# Patient Record
Sex: FEMALE | Race: WHITE | Employment: FULL TIME
[De-identification: names, ages, dates, MRNs, and addresses within clinical notes are randomized per-mention and may not be internally consistent; named-entity substitution may affect disease eponyms.]

---

## 2017-01-12 ENCOUNTER — OFFICE VISIT (OUTPATIENT)
Dept: OBGYN | Facility: CLINIC | Age: 27
End: 2017-01-12

## 2017-01-12 VITALS
HEIGHT: 63 IN | RESPIRATION RATE: 18 BRPM | WEIGHT: 133 LBS | BODY MASS INDEX: 23.57 KG/M2 | HEART RATE: 76 BPM | DIASTOLIC BLOOD PRESSURE: 68 MMHG | SYSTOLIC BLOOD PRESSURE: 112 MMHG

## 2017-01-12 DIAGNOSIS — N76.0 BV (BACTERIAL VAGINOSIS): ICD-10-CM

## 2017-01-12 DIAGNOSIS — B96.89 BV (BACTERIAL VAGINOSIS): ICD-10-CM

## 2017-01-12 DIAGNOSIS — R10.2 VAGINAL PAIN: ICD-10-CM

## 2017-01-12 DIAGNOSIS — N89.8 VAGINA ITCHING: Primary | ICD-10-CM

## 2017-01-12 PROCEDURE — 1036F TOBACCO NON-USER: CPT | Performed by: OBSTETRICS & GYNECOLOGY

## 2017-01-12 PROCEDURE — G8427 DOCREV CUR MEDS BY ELIG CLIN: HCPCS | Performed by: OBSTETRICS & GYNECOLOGY

## 2017-01-12 PROCEDURE — G8420 CALC BMI NORM PARAMETERS: HCPCS | Performed by: OBSTETRICS & GYNECOLOGY

## 2017-01-12 PROCEDURE — G8484 FLU IMMUNIZE NO ADMIN: HCPCS | Performed by: OBSTETRICS & GYNECOLOGY

## 2017-01-12 PROCEDURE — 99213 OFFICE O/P EST LOW 20 MIN: CPT | Performed by: OBSTETRICS & GYNECOLOGY

## 2017-01-12 RX ORDER — METRONIDAZOLE 500 MG/1
500 TABLET ORAL 2 TIMES DAILY
Qty: 14 TABLET | Refills: 1 | Status: SHIPPED | OUTPATIENT
Start: 2017-01-12 | End: 2017-01-19

## 2017-01-12 RX ORDER — CLOTRIMAZOLE AND BETAMETHASONE DIPROPIONATE 10; .64 MG/G; MG/G
CREAM TOPICAL
Qty: 15 G | Refills: 2 | Status: SHIPPED | OUTPATIENT
Start: 2017-01-12 | End: 2017-11-21 | Stop reason: ALTCHOICE

## 2017-01-12 ASSESSMENT — PATIENT HEALTH QUESTIONNAIRE - PHQ9
SUM OF ALL RESPONSES TO PHQ QUESTIONS 1-9: 0
2. FEELING DOWN, DEPRESSED OR HOPELESS: 0
SUM OF ALL RESPONSES TO PHQ9 QUESTIONS 1 & 2: 0
1. LITTLE INTEREST OR PLEASURE IN DOING THINGS: 0

## 2017-01-17 RX ORDER — FLUCONAZOLE 150 MG/1
TABLET ORAL
Qty: 2 TABLET | Refills: 0 | Status: SHIPPED | OUTPATIENT
Start: 2017-01-17 | End: 2017-11-21 | Stop reason: ALTCHOICE

## 2017-04-14 PROBLEM — J02.0 PHARYNGITIS DUE TO STREPTOCOCCUS SPECIES: Status: ACTIVE | Noted: 2017-04-14

## 2017-11-21 ENCOUNTER — HOSPITAL ENCOUNTER (OUTPATIENT)
Age: 27
Setting detail: SPECIMEN
Discharge: HOME | End: 2017-11-21
Payer: COMMERCIAL

## 2017-11-21 ENCOUNTER — OFFICE VISIT (OUTPATIENT)
Dept: OBGYN | Age: 27
End: 2017-11-21
Payer: COMMERCIAL

## 2017-11-21 VITALS
BODY MASS INDEX: 25.16 KG/M2 | HEIGHT: 63 IN | DIASTOLIC BLOOD PRESSURE: 68 MMHG | WEIGHT: 142 LBS | SYSTOLIC BLOOD PRESSURE: 128 MMHG

## 2017-11-21 DIAGNOSIS — Z01.419 WOMEN'S ANNUAL ROUTINE GYNECOLOGICAL EXAMINATION: Primary | ICD-10-CM

## 2017-11-21 DIAGNOSIS — Z01.419 WOMEN'S ANNUAL ROUTINE GYNECOLOGICAL EXAMINATION: ICD-10-CM

## 2017-11-21 DIAGNOSIS — Z31.7 ENCOUNTER FOR PROCREATIVE MANAGEMENT AND COUNSELING FOR PERSON ACTING AS GESTATIONAL SURROGATE: ICD-10-CM

## 2017-11-21 DIAGNOSIS — Z12.39 SCREENING FOR BREAST CANCER: ICD-10-CM

## 2017-11-21 PROCEDURE — G0145 SCR C/V CYTO,THINLAYER,RESCR: HCPCS

## 2017-11-21 PROCEDURE — 99395 PREV VISIT EST AGE 18-39: CPT | Performed by: OBSTETRICS & GYNECOLOGY

## 2017-11-21 RX ORDER — NORETHINDRONE ACETATE AND ETHINYL ESTRADIOL 1MG-20(21)
1 KIT ORAL DAILY
Qty: 1 PACKET | Refills: 11 | Status: SHIPPED | OUTPATIENT
Start: 2017-11-21 | End: 2018-11-07

## 2017-11-21 NOTE — PROGRESS NOTES
YEARLY PHYSICAL    Date of service: 2017    Maude Armstrong  Is a 32 y.o.  single female    PT's PCP is: Jovanna Tran MD     : 1990                                             Subjective:       Patient's last menstrual period was 10/29/2017 (exact date). Are your menses regular: yes    OB History    Para Term  AB Living   3 2 2   1     SAB TAB Ectopic Molar Multiple Live Births   1                # Outcome Date GA Lbr Robin/2nd Weight Sex Delivery Anes PTL Lv   3 Term 14 39w5d 07:01 8 lb 6.1 oz (3.802 kg) F Vag-Spont      2 SAB               Birth Comments: missed   1 Term  41w0d  9 lb 4 oz (4.196 kg) M Vag-Spont              History   Smoking Status    Never Smoker   Smokeless Tobacco    Never Used        History   Alcohol Use No       Family History   Problem Relation Age of Onset    Asthma Brother     Other Brother      internal strep infection causing organ failure, age 13       Allergies: Review of patient's allergies indicates no known allergies. Current Outpatient Prescriptions:     Lactobacillus (PROBIOTIC ACIDOPHILUS PO), Take by mouth, Disp: , Rfl:     History   Sexual Activity    Sexual activity: Yes    Partners: Male       Any bleeding or pain with intercourse: No    Last Yearly:  unknown    Last pap: unkown    Last HPV: never    Last Mammogram: never    Last Dexascan never    Do you do self breast exams: Yes    History reviewed. No pertinent past medical history.     Past Surgical History:   Procedure Laterality Date    DILATION AND CURETTAGE OF UTERUS  13    missed        Family History   Problem Relation Age of Onset    Asthma Brother     Other Brother      internal strep infection causing organ failure, age 13       Chief Complaint   Patient presents with    Gynecologic Exam          Nurse: CHAVO  HPI: Pt presents for annual exam. She denies any current gyn concerns. She is having regular cycles, denies pain. She states she is interested in considering being a surrogate for pregnancy and has been exploring this for a year. Review of her medical history does not identify any contraindications and she is advised of this. She has a h/o 1st trimester miscarriage but no issues w/ recurrent pregnancy loss. She inquires if she is a good candidate and is advised that medically I see no concerns. She is cautioned that it is an emotional investment for both patient and family and she seems to have good insight into this. She plans to have psychological testing and potentially counselling prior to committing to this decision. 15 min spent discussing surrogacy and different aspects of this. Reviewed possible need for std screening but will wait for a definitive decision prior to ordering any testing. Yes PT denies fever, chills, nausea and vomiting       Objective  Lymphatic:   no lymphadenopathy  Heent:   negative   Cor: regular rate and rhythm, no murmurs              Pul:clear to auscultation bilaterally- no wheezes, rales or rhonchi, normal air movement, no respiratory distress      GI: Abdomen soft, non-tender. BS normal. No masses,  No organomegaly           Extremities: normal strength, tone, and muscle mass   Breasts: Breast:normal appearance, no masses or tenderness   Pelvic Exam: GENITAL/URINARY:  External Genitalia:  General appearance; normal,pt is removing all pubic hairl, Lesions absent  Urethral Meatus:  Size normal, Location normal, Lesions absent, Prolapse absent  Urethra:   Fullness absent, Masses absent  Bladder:  Fullness absent, Masses absent, Tenderness absent, Cystocele absent  Vagina:  General appearance normal, Estrogen effect normal, Discharge absent, Lesions absent, Pelvic support normal  Cervix:  General appearance normal, Lesions absent, Discharge absent, Tenderness absent, Enlargement absent, Nodularity absent  Uterus:  Size normal, Contour normal, Position normal, Tenderness absent, uterine decensus present, no overt prolapse noted. No traction applied to cervix (very anterior)  Adenexa: Masses absent, Tenderness absent, Enlargement absent  Anus/Perineum:  Lesions absent and Masses absent                                    Vaginal discharge: no vaginal discharge        She was also counseled on her preventative health maintenance recommendations and follow-up. Assessment and Plan         1. Women's annual routine gynecological examination  PAP SMEAR   2. Screening for breast cancer     3. Encounter for procreative management and counseling for person acting as gestational surrogate               I have discontinued Ms. Rebolledo's clotrimazole-betamethasone, fluconazole, and nystatin. I am also having her start on norethindrone-ethinyl estradiol. Additionally, I am having her maintain her Lactobacillus (PROBIOTIC ACIDOPHILUS PO).     Noemy José,11/21/2017 9:51 AM

## 2017-11-30 LAB — CYTOLOGY REPORT: NORMAL

## 2018-09-13 ENCOUNTER — HOSPITAL ENCOUNTER (OUTPATIENT)
Age: 28
Setting detail: SPECIMEN
Discharge: HOME | End: 2018-09-13
Payer: COMMERCIAL

## 2018-09-13 ENCOUNTER — OFFICE VISIT (OUTPATIENT)
Dept: PRIMARY CARE CLINIC | Age: 28
End: 2018-09-13
Payer: COMMERCIAL

## 2018-09-13 VITALS
SYSTOLIC BLOOD PRESSURE: 123 MMHG | HEIGHT: 63 IN | WEIGHT: 138.3 LBS | RESPIRATION RATE: 14 BRPM | BODY MASS INDEX: 24.5 KG/M2 | DIASTOLIC BLOOD PRESSURE: 72 MMHG | HEART RATE: 100 BPM | TEMPERATURE: 99 F

## 2018-09-13 DIAGNOSIS — J02.9 SORE THROAT: ICD-10-CM

## 2018-09-13 DIAGNOSIS — J03.90 EXUDATIVE TONSILLITIS: Primary | ICD-10-CM

## 2018-09-13 DIAGNOSIS — Z20.818 STREP THROAT EXPOSURE: ICD-10-CM

## 2018-09-13 DIAGNOSIS — J03.90 EXUDATIVE TONSILLITIS: ICD-10-CM

## 2018-09-13 LAB — S PYO AG THROAT QL: NORMAL

## 2018-09-13 PROCEDURE — 87880 STREP A ASSAY W/OPTIC: CPT | Performed by: NURSE PRACTITIONER

## 2018-09-13 PROCEDURE — 99213 OFFICE O/P EST LOW 20 MIN: CPT | Performed by: NURSE PRACTITIONER

## 2018-09-13 PROCEDURE — 87651 STREP A DNA AMP PROBE: CPT

## 2018-09-13 RX ORDER — AMOXICILLIN 500 MG/1
500 CAPSULE ORAL 3 TIMES DAILY
Qty: 30 CAPSULE | Refills: 0 | Status: SHIPPED | OUTPATIENT
Start: 2018-09-13 | End: 2018-09-23

## 2018-09-13 ASSESSMENT — ENCOUNTER SYMPTOMS
SWOLLEN GLANDS: 1
TROUBLE SWALLOWING: 0
VOMITING: 0
COUGH: 0
SORE THROAT: 1
ABDOMINAL PAIN: 0

## 2018-09-14 LAB
DIRECT EXAM: NORMAL
Lab: NORMAL
SPECIMEN DESCRIPTION: NORMAL
STATUS: NORMAL

## 2018-09-17 RX ORDER — FLUCONAZOLE 150 MG/1
150 TABLET ORAL ONCE
Qty: 1 TABLET | Refills: 0 | Status: SHIPPED | OUTPATIENT
Start: 2018-09-17 | End: 2018-09-17

## 2018-11-07 ENCOUNTER — INITIAL PRENATAL (OUTPATIENT)
Dept: OBGYN | Age: 28
End: 2018-11-07
Payer: COMMERCIAL

## 2018-11-07 ENCOUNTER — HOSPITAL ENCOUNTER (OUTPATIENT)
Age: 28
Setting detail: SPECIMEN
Discharge: HOME | End: 2018-11-07
Payer: COMMERCIAL

## 2018-11-07 VITALS — WEIGHT: 140.8 LBS | DIASTOLIC BLOOD PRESSURE: 76 MMHG | SYSTOLIC BLOOD PRESSURE: 118 MMHG | BODY MASS INDEX: 24.94 KG/M2

## 2018-11-07 DIAGNOSIS — Z32.01 POSITIVE URINE PREGNANCY TEST: ICD-10-CM

## 2018-11-07 DIAGNOSIS — Z34.91 ENCOUNTER FOR SUPERVISION OF NORMAL PREGNANCY IN FIRST TRIMESTER, UNSPECIFIED GRAVIDITY: ICD-10-CM

## 2018-11-07 DIAGNOSIS — N91.2 AMENORRHEA: ICD-10-CM

## 2018-11-07 DIAGNOSIS — N91.2 AMENORRHEA: Primary | ICD-10-CM

## 2018-11-07 LAB
ABO/RH: NORMAL
AMPHETAMINE SCREEN URINE: NEGATIVE
ANTIBODY SCREEN: NEGATIVE
BARBITURATE SCREEN URINE: NEGATIVE
BENZODIAZEPINE SCREEN, URINE: NEGATIVE
BUPRENORPHINE URINE: NEGATIVE
CANNABINOID SCREEN URINE: NEGATIVE
COCAINE METABOLITE, URINE: NEGATIVE
CONTROL: ABNORMAL
MDMA URINE: NORMAL
METHADONE SCREEN, URINE: NEGATIVE
METHAMPHETAMINE, URINE: NEGATIVE
OPIATES, URINE: NEGATIVE
OXYCODONE SCREEN URINE: NEGATIVE
PHENCYCLIDINE, URINE: NEGATIVE
PREGNANCY TEST URINE, POC: POSITIVE
PROPOXYPHENE, URINE: NEGATIVE
TEST INFORMATION: NORMAL
TRICYCLIC ANTIDEPRESSANTS, UR: NEGATIVE

## 2018-11-07 PROCEDURE — 86803 HEPATITIS C AB TEST: CPT

## 2018-11-07 PROCEDURE — 87389 HIV-1 AG W/HIV-1&-2 AB AG IA: CPT

## 2018-11-07 PROCEDURE — 36415 COLL VENOUS BLD VENIPUNCTURE: CPT | Performed by: ADVANCED PRACTICE MIDWIFE

## 2018-11-07 PROCEDURE — 86901 BLOOD TYPING SEROLOGIC RH(D): CPT

## 2018-11-07 PROCEDURE — 87491 CHLMYD TRACH DNA AMP PROBE: CPT

## 2018-11-07 PROCEDURE — 85025 COMPLETE CBC W/AUTO DIFF WBC: CPT

## 2018-11-07 PROCEDURE — 80306 DRUG TEST PRSMV INSTRMNT: CPT

## 2018-11-07 PROCEDURE — 86780 TREPONEMA PALLIDUM: CPT

## 2018-11-07 PROCEDURE — 87086 URINE CULTURE/COLONY COUNT: CPT

## 2018-11-07 PROCEDURE — 86762 RUBELLA ANTIBODY: CPT

## 2018-11-07 PROCEDURE — 0502F SUBSEQUENT PRENATAL CARE: CPT | Performed by: ADVANCED PRACTICE MIDWIFE

## 2018-11-07 PROCEDURE — 87591 N.GONORRHOEAE DNA AMP PROB: CPT

## 2018-11-07 PROCEDURE — 86900 BLOOD TYPING SEROLOGIC ABO: CPT

## 2018-11-07 PROCEDURE — 86850 RBC ANTIBODY SCREEN: CPT

## 2018-11-07 PROCEDURE — 87340 HEPATITIS B SURFACE AG IA: CPT

## 2018-11-07 ASSESSMENT — PATIENT HEALTH QUESTIONNAIRE - PHQ9
2. FEELING DOWN, DEPRESSED OR HOPELESS: 0
SUM OF ALL RESPONSES TO PHQ QUESTIONS 1-9: 0
SUM OF ALL RESPONSES TO PHQ QUESTIONS 1-9: 0
SUM OF ALL RESPONSES TO PHQ9 QUESTIONS 1 & 2: 0
1. LITTLE INTEREST OR PLEASURE IN DOING THINGS: 0

## 2018-11-07 NOTE — PROGRESS NOTES
New OB Visit    Date of service: 2018    Shanti Mendoza  Is a 25 y.o.  female presenting for a New OB visit with Nurse. Name of Father of Frazier Goodpasture is [facility] and is involved. Pt does work at [employer].      PT's PCP is: Regina Winn MD     : 1990                                             Subjective:       Patient's last menstrual period was 2018 (approximate). OB History    Para Term  AB Living   4 2 2   1 2   SAB TAB Ectopic Molar Multiple Live Births   1         2      # Outcome Date GA Lbr Robin/2nd Weight Sex Delivery Anes PTL Lv   4 Current            3 Term 14 39w5d 07:01 8 lb 6.1 oz (3.802 kg) F Vag-Spont EPI  VIOLETA   2 SAB  9w0d             Birth Comments: 13 week D&C   1 Term 11 41w0d  9 lb 4 oz (4.196 kg) M Vag-Spont EPI N VIOLETA                History   Smoking Status    Never Smoker   Smokeless Tobacco    Never Used        History   Alcohol Use No        Allergies: Patient has no known allergies. Current Outpatient Prescriptions:     Prenatal MV-Min-Fe Fum-FA-DHA (PRENATAL 1 PO), Take by mouth daily, Disp: , Rfl:     Lactobacillus (PROBIOTIC ACIDOPHILUS PO), Take by mouth, Disp: , Rfl:       Vital Signs Last menstrual period 2018, not currently breastfeeding. No results found for this visit on 18. Pain: none                            Nausea: yes      Vomiting: no        Breast enlargement or tenderness: yes    Frequency of urination:yes      Fatigue: yes           Patient history reviewed. Educational materials given and genetic testing reviewed. Assessment:      Diagnosis Orders   1. Amenorrhea  C.trachomatis N.gonorrhoeae DNA, Urine    Hepatitis C Antibody    HIV Screen    POCT urine pregnancy    PRENATAL PROFILE I    Prenatal type and screen    Urine culture clean catch    Urine Drug Screen, Comprehensive   2.  Positive urine pregnancy test  C.trachomatis N.gonorrhoeae DNA, Urine

## 2018-11-08 LAB
ABSOLUTE EOS #: 0.12 K/UL (ref 0–0.44)
ABSOLUTE IMMATURE GRANULOCYTE: <0.03 K/UL (ref 0–0.3)
ABSOLUTE LYMPH #: 2.15 K/UL (ref 1.1–3.7)
ABSOLUTE MONO #: 0.47 K/UL (ref 0.1–1.2)
BASOPHILS # BLD: 0 % (ref 0–2)
BASOPHILS ABSOLUTE: 0.03 K/UL (ref 0–0.2)
DIFFERENTIAL TYPE: ABNORMAL
EOSINOPHILS RELATIVE PERCENT: 2 % (ref 1–4)
HCT VFR BLD CALC: 36.9 % (ref 36.3–47.1)
HEMOGLOBIN: 12 G/DL (ref 11.9–15.1)
HEPATITIS B SURFACE ANTIGEN: NONREACTIVE
HEPATITIS C ANTIBODY: NONREACTIVE
HIV AG/AB: NONREACTIVE
IMMATURE GRANULOCYTES: 0 %
LYMPHOCYTES # BLD: 31 % (ref 24–43)
MCH RBC QN AUTO: 31.4 PG (ref 25.2–33.5)
MCHC RBC AUTO-ENTMCNC: 32.5 G/DL (ref 28.4–34.8)
MCV RBC AUTO: 96.6 FL (ref 82.6–102.9)
MONOCYTES # BLD: 7 % (ref 3–12)
NRBC AUTOMATED: 0 PER 100 WBC
PDW BLD-RTO: 12.2 % (ref 11.8–14.4)
PLATELET # BLD: 243 K/UL (ref 138–453)
PLATELET ESTIMATE: ABNORMAL
PMV BLD AUTO: 11.2 FL (ref 8.1–13.5)
RBC # BLD: 3.82 M/UL (ref 3.95–5.11)
RBC # BLD: ABNORMAL 10*6/UL
RUBV IGG SER QL: 240.4 IU/ML
SEG NEUTROPHILS: 60 % (ref 36–65)
SEGMENTED NEUTROPHILS ABSOLUTE COUNT: 4.1 K/UL (ref 1.5–8.1)
T. PALLIDUM, IGG: NONREACTIVE
WBC # BLD: 6.9 K/UL (ref 3.5–11.3)
WBC # BLD: ABNORMAL 10*3/UL

## 2018-11-09 LAB
C. TRACHOMATIS DNA ,URINE: NEGATIVE
CULTURE: NORMAL
Lab: NORMAL
N. GONORRHOEAE DNA, URINE: NEGATIVE
SPECIMEN DESCRIPTION: NORMAL
STATUS: NORMAL

## 2018-11-12 RX ORDER — PROMETHAZINE HYDROCHLORIDE 25 MG/1
25 TABLET ORAL EVERY 6 HOURS PRN
Qty: 30 TABLET | Refills: 1 | Status: ON HOLD | OUTPATIENT
Start: 2018-11-12 | End: 2019-06-22 | Stop reason: HOSPADM

## 2018-11-12 RX ORDER — ONDANSETRON 4 MG/1
4 TABLET, FILM COATED ORAL DAILY PRN
Qty: 30 TABLET | Refills: 0 | OUTPATIENT
Start: 2018-11-12

## 2018-11-21 ENCOUNTER — PROCEDURE VISIT (OUTPATIENT)
Dept: OBGYN | Age: 28
End: 2018-11-21
Payer: COMMERCIAL

## 2018-11-21 DIAGNOSIS — Z87.59 ULTRASOUND SCAN TO CONFIRM FETAL VIABILITY WITH HISTORY OF MISCARRIAGE: Primary | ICD-10-CM

## 2018-11-21 DIAGNOSIS — O36.80X0 ULTRASOUND SCAN TO CONFIRM FETAL VIABILITY WITH HISTORY OF MISCARRIAGE: Primary | ICD-10-CM

## 2018-11-21 DIAGNOSIS — Z3A.08 8 WEEKS GESTATION OF PREGNANCY: ICD-10-CM

## 2018-11-21 LAB
CRL: 2.19 CM
SAC DIAMETER: NORMAL CM

## 2018-11-21 PROCEDURE — 76801 OB US < 14 WKS SINGLE FETUS: CPT | Performed by: OBSTETRICS & GYNECOLOGY

## 2018-12-10 ENCOUNTER — HOSPITAL ENCOUNTER (OUTPATIENT)
Age: 28
Setting detail: SPECIMEN
Discharge: HOME | End: 2018-12-10
Payer: COMMERCIAL

## 2018-12-10 ENCOUNTER — ROUTINE PRENATAL (OUTPATIENT)
Dept: OBGYN | Age: 28
End: 2018-12-10

## 2018-12-10 VITALS — DIASTOLIC BLOOD PRESSURE: 72 MMHG | WEIGHT: 141.8 LBS | SYSTOLIC BLOOD PRESSURE: 118 MMHG | BODY MASS INDEX: 25.12 KG/M2

## 2018-12-10 DIAGNOSIS — Z3A.11 11 WEEKS GESTATION OF PREGNANCY: ICD-10-CM

## 2018-12-10 DIAGNOSIS — Z34.81 ENCOUNTER FOR SUPERVISION OF OTHER NORMAL PREGNANCY IN FIRST TRIMESTER: Primary | ICD-10-CM

## 2018-12-10 PROCEDURE — 87070 CULTURE OTHR SPECIMN AEROBIC: CPT

## 2018-12-10 PROCEDURE — 0500F INITIAL PRENATAL CARE VISIT: CPT | Performed by: ADVANCED PRACTICE MIDWIFE

## 2018-12-10 NOTE — PROGRESS NOTES
Maria De Jesus Gomez is here at 11w4d for:    Chief Complaint   Patient presents with    Routine Prenatal Visit     Patient presents today for routine OB visit;        Estimated Due Date: Estimated Date of Delivery: 19    OB History    Para Term  AB Living   4 2 2   1 2   SAB TAB Ectopic Molar Multiple Live Births   1         2      # Outcome Date GA Lbr Robin/2nd Weight Sex Delivery Anes PTL Lv   4 Current            3 Term 14 39w5d 07:01 8 lb 6.1 oz (3.802 kg) F Vag-Spont EPI  VIOLETA   2 SAB  9w0d             Birth Comments: 13 week D&C   1 Term 11 41w0d  9 lb 4 oz (4.196 kg) M Vag-Spont EPI N VIOLETA           Past Medical History:   Diagnosis Date    Breast disorder     Baltazar Im infection        Past Surgical History:   Procedure Laterality Date    DILATION AND CURETTAGE      DILATION AND CURETTAGE OF UTERUS  13    missed        History   Smoking Status    Never Smoker   Smokeless Tobacco    Never Used        History   Alcohol Use No       No results found for this visit on 12/10/18. Vitals:  /72   Wt 141 lb 12.8 oz (64.3 kg)   LMP 2018 (Approximate)   BMI 25.12 kg/m²   Estimated body mass index is 25.12 kg/m² as calculated from the following:    Height as of 18: 5' 3\" (1.6 m). Weight as of this encounter: 141 lb 12.8 oz (64.3 kg). Labs:    No results found for this visit on 12/10/18. HPI: here for initial ob visit, and exam     PT denies fever, chills, nausea and vomiting       Abdomen: flat, soft, nontender  Total body exam completed please see prenatal physical exam tab in visit navigator       See prenatal vital sign section and fetal assessment section    ASSESSMENT & Plan    Diagnosis Orders   1. Encounter for supervision of other normal pregnancy in first trimester     2. 11 weeks gestation of pregnancy  Culture, Genital             I am having Ms. Rebolledo maintain her Lactobacillus (PROBIOTIC ACIDOPHILUS PO), Prenatal

## 2018-12-10 NOTE — PROGRESS NOTES
Patient states she is having a lot of nausea; Patient states she is taking the phenergan every other day, b/c it makes her really sleepy, so she is only taking half, however states the morning sickness is awful; Patient states she has had some cramping that got worse over the weekend, and states its lower cramping in the pelvic area; Patient states she started bleeding, sometimes its dark and sometimes it brighter and it seems heavier in the mornings;  Patients last pap was 11/2017;

## 2018-12-13 LAB
CULTURE: NORMAL
Lab: NORMAL
SPECIMEN DESCRIPTION: NORMAL
STATUS: NORMAL

## 2019-01-07 ENCOUNTER — ROUTINE PRENATAL (OUTPATIENT)
Dept: OBGYN | Age: 29
End: 2019-01-07

## 2019-01-07 VITALS — WEIGHT: 140.6 LBS | SYSTOLIC BLOOD PRESSURE: 122 MMHG | DIASTOLIC BLOOD PRESSURE: 70 MMHG | BODY MASS INDEX: 24.91 KG/M2

## 2019-01-07 DIAGNOSIS — Z3A.15 15 WEEKS GESTATION OF PREGNANCY: ICD-10-CM

## 2019-01-07 DIAGNOSIS — Z34.82 ENCOUNTER FOR SUPERVISION OF OTHER NORMAL PREGNANCY IN SECOND TRIMESTER: Primary | ICD-10-CM

## 2019-01-07 DIAGNOSIS — O21.9 NAUSEA AND VOMITING DURING PREGNANCY: ICD-10-CM

## 2019-01-07 PROCEDURE — 0502F SUBSEQUENT PRENATAL CARE: CPT | Performed by: ADVANCED PRACTICE MIDWIFE

## 2019-01-07 RX ORDER — PROMETHAZINE HYDROCHLORIDE 25 MG/1
25 TABLET ORAL EVERY 6 HOURS PRN
Qty: 30 TABLET | Refills: 1 | Status: SHIPPED | OUTPATIENT
Start: 2019-01-07 | End: 2019-01-14

## 2019-02-11 ENCOUNTER — ROUTINE PRENATAL (OUTPATIENT)
Dept: OBGYN | Age: 29
End: 2019-02-11
Payer: COMMERCIAL

## 2019-02-11 VITALS — DIASTOLIC BLOOD PRESSURE: 76 MMHG | BODY MASS INDEX: 26.22 KG/M2 | SYSTOLIC BLOOD PRESSURE: 120 MMHG | WEIGHT: 148 LBS

## 2019-02-11 DIAGNOSIS — Z36.3 ANTENATAL SCREENING FOR MALFORMATION USING ULTRASONICS: Primary | ICD-10-CM

## 2019-02-11 DIAGNOSIS — Z34.82 ENCOUNTER FOR SUPERVISION OF OTHER NORMAL PREGNANCY IN SECOND TRIMESTER: ICD-10-CM

## 2019-02-11 DIAGNOSIS — Z3A.20 20 WEEKS GESTATION OF PREGNANCY: ICD-10-CM

## 2019-02-11 LAB
ABDOMINAL CIRCUMFERENCE: 16.09 CM
BIPARIETAL DIAMETER: 5.05 CM
ESTIMATED FETAL WEIGHT: 380 GRAMS
FEMORAL DIAMETER: NORMAL CM
HC/AC: 1.15
HEAD CIRCUMFERENCE: 17.4 CM

## 2019-02-11 PROCEDURE — 0502F SUBSEQUENT PRENATAL CARE: CPT | Performed by: ADVANCED PRACTICE MIDWIFE

## 2019-02-11 PROCEDURE — 76805 OB US >/= 14 WKS SNGL FETUS: CPT | Performed by: OBSTETRICS & GYNECOLOGY

## 2019-02-20 ENCOUNTER — OFFICE VISIT (OUTPATIENT)
Dept: FAMILY MEDICINE CLINIC | Age: 29
End: 2019-02-20
Payer: COMMERCIAL

## 2019-02-20 VITALS
SYSTOLIC BLOOD PRESSURE: 120 MMHG | TEMPERATURE: 100.5 F | WEIGHT: 150 LBS | DIASTOLIC BLOOD PRESSURE: 60 MMHG | HEIGHT: 63 IN | BODY MASS INDEX: 26.58 KG/M2

## 2019-02-20 DIAGNOSIS — J10.1 INFLUENZA B: Primary | ICD-10-CM

## 2019-02-20 LAB
INFLUENZA A ANTIBODY: NEGATIVE
INFLUENZA B ANTIBODY: POSITIVE

## 2019-02-20 PROCEDURE — 87804 INFLUENZA ASSAY W/OPTIC: CPT | Performed by: FAMILY MEDICINE

## 2019-02-20 PROCEDURE — 99203 OFFICE O/P NEW LOW 30 MIN: CPT | Performed by: FAMILY MEDICINE

## 2019-02-20 RX ORDER — OSELTAMIVIR PHOSPHATE 75 MG/1
75 CAPSULE ORAL 2 TIMES DAILY
Qty: 10 CAPSULE | Refills: 0 | Status: ON HOLD | OUTPATIENT
Start: 2019-02-20 | End: 2019-02-26 | Stop reason: HOSPADM

## 2019-02-20 ASSESSMENT — PATIENT HEALTH QUESTIONNAIRE - PHQ9
2. FEELING DOWN, DEPRESSED OR HOPELESS: 0
SUM OF ALL RESPONSES TO PHQ9 QUESTIONS 1 & 2: 0
SUM OF ALL RESPONSES TO PHQ QUESTIONS 1-9: 0
SUM OF ALL RESPONSES TO PHQ QUESTIONS 1-9: 0
1. LITTLE INTEREST OR PLEASURE IN DOING THINGS: 0

## 2019-02-22 ENCOUNTER — APPOINTMENT (OUTPATIENT)
Dept: GENERAL RADIOLOGY | Age: 29
DRG: 831 | End: 2019-02-22
Payer: COMMERCIAL

## 2019-02-22 ENCOUNTER — TELEPHONE (OUTPATIENT)
Dept: OBGYN | Age: 29
End: 2019-02-22

## 2019-02-22 ENCOUNTER — HOSPITAL ENCOUNTER (INPATIENT)
Age: 29
LOS: 4 days | Discharge: HOME | DRG: 831 | End: 2019-02-26
Attending: FAMILY MEDICINE | Admitting: FAMILY MEDICINE
Payer: COMMERCIAL

## 2019-02-22 DIAGNOSIS — R11.2 NAUSEA AND VOMITING, INTRACTABILITY OF VOMITING NOT SPECIFIED, UNSPECIFIED VOMITING TYPE: ICD-10-CM

## 2019-02-22 DIAGNOSIS — J11.1 INFLUENZA WITH RESPIRATORY MANIFESTATION OTHER THAN PNEUMONIA: Primary | ICD-10-CM

## 2019-02-22 LAB
-: NORMAL
ABSOLUTE EOS #: <0.03 K/UL (ref 0–0.44)
ABSOLUTE IMMATURE GRANULOCYTE: 0.03 K/UL (ref 0–0.3)
ABSOLUTE LYMPH #: 0.89 K/UL (ref 1.1–3.7)
ABSOLUTE MONO #: 0.52 K/UL (ref 0.1–1.2)
ALBUMIN SERPL-MCNC: 2.8 G/DL (ref 3.5–5.2)
ALBUMIN/GLOBULIN RATIO: 1 (ref 1–2.5)
ALP BLD-CCNC: 86 U/L (ref 35–104)
ALT SERPL-CCNC: 8 U/L (ref 5–33)
AMORPHOUS: NORMAL
ANION GAP SERPL CALCULATED.3IONS-SCNC: 15 MMOL/L (ref 9–17)
AST SERPL-CCNC: 14 U/L
BACTERIA: NORMAL
BASOPHILS # BLD: 0 % (ref 0–2)
BASOPHILS ABSOLUTE: <0.03 K/UL (ref 0–0.2)
BILIRUB SERPL-MCNC: 0.5 MG/DL (ref 0.3–1.2)
BILIRUBIN URINE: ABNORMAL
BUN BLDV-MCNC: 7 MG/DL (ref 6–20)
BUN/CREAT BLD: 16 (ref 9–20)
CALCIUM SERPL-MCNC: 7.4 MG/DL (ref 8.6–10.4)
CASTS UA: NORMAL /LPF
CHLORIDE BLD-SCNC: 104 MMOL/L (ref 98–107)
CO2: 16 MMOL/L (ref 20–31)
COLOR: YELLOW
COMMENT UA: ABNORMAL
CREAT SERPL-MCNC: 0.44 MG/DL (ref 0.5–0.9)
CRYSTALS, UA: NORMAL /HPF
DIFFERENTIAL TYPE: ABNORMAL
EOSINOPHILS RELATIVE PERCENT: 0 % (ref 1–4)
EPITHELIAL CELLS UA: NORMAL /HPF (ref 0–25)
GFR AFRICAN AMERICAN: >60 ML/MIN
GFR NON-AFRICAN AMERICAN: >60 ML/MIN
GFR SERPL CREATININE-BSD FRML MDRD: ABNORMAL ML/MIN/{1.73_M2}
GFR SERPL CREATININE-BSD FRML MDRD: ABNORMAL ML/MIN/{1.73_M2}
GLUCOSE BLD-MCNC: 88 MG/DL (ref 70–99)
GLUCOSE URINE: NEGATIVE
HCT VFR BLD CALC: 27.2 % (ref 36.3–47.1)
HEMOGLOBIN: 8.7 G/DL (ref 11.9–15.1)
IMMATURE GRANULOCYTES: 1 %
KETONES, URINE: ABNORMAL
LEUKOCYTE ESTERASE, URINE: ABNORMAL
LYMPHOCYTES # BLD: 14 % (ref 24–43)
MCH RBC QN AUTO: 31.6 PG (ref 25.2–33.5)
MCHC RBC AUTO-ENTMCNC: 32 G/DL (ref 28.4–34.8)
MCV RBC AUTO: 98.9 FL (ref 82.6–102.9)
MONOCYTES # BLD: 8 % (ref 3–12)
MUCUS: NORMAL
NITRITE, URINE: NEGATIVE
NRBC AUTOMATED: 0 PER 100 WBC
OTHER OBSERVATIONS UA: NORMAL
PDW BLD-RTO: 13.2 % (ref 11.8–14.4)
PH UA: 6 (ref 5–9)
PLATELET # BLD: 187 K/UL (ref 138–453)
PLATELET ESTIMATE: ABNORMAL
PMV BLD AUTO: 10 FL (ref 8.1–13.5)
POTASSIUM SERPL-SCNC: 3.4 MMOL/L (ref 3.7–5.3)
PROTEIN UA: ABNORMAL
RBC # BLD: 2.75 M/UL (ref 3.95–5.11)
RBC # BLD: ABNORMAL 10*6/UL
RBC UA: NORMAL /HPF (ref 0–2)
RENAL EPITHELIAL, UA: NORMAL /HPF
SEG NEUTROPHILS: 77 % (ref 36–65)
SEGMENTED NEUTROPHILS ABSOLUTE COUNT: 5.11 K/UL (ref 1.5–8.1)
SODIUM BLD-SCNC: 135 MMOL/L (ref 135–144)
SPECIFIC GRAVITY UA: 1.02 (ref 1.01–1.02)
TOTAL PROTEIN: 5.7 G/DL (ref 6.4–8.3)
TRICHOMONAS: NORMAL
TURBIDITY: CLEAR
URINE HGB: NEGATIVE
UROBILINOGEN, URINE: NORMAL
WBC # BLD: 6.6 K/UL (ref 3.5–11.3)
WBC # BLD: ABNORMAL 10*3/UL
WBC UA: NORMAL /HPF (ref 0–5)
YEAST: NORMAL

## 2019-02-22 PROCEDURE — 36415 COLL VENOUS BLD VENIPUNCTURE: CPT

## 2019-02-22 PROCEDURE — 81001 URINALYSIS AUTO W/SCOPE: CPT

## 2019-02-22 PROCEDURE — 6370000000 HC RX 637 (ALT 250 FOR IP): Performed by: PHYSICIAN ASSISTANT

## 2019-02-22 PROCEDURE — 99284 EMERGENCY DEPT VISIT MOD MDM: CPT

## 2019-02-22 PROCEDURE — 96372 THER/PROPH/DIAG INJ SC/IM: CPT

## 2019-02-22 PROCEDURE — 1200000000 HC SEMI PRIVATE

## 2019-02-22 PROCEDURE — 80053 COMPREHEN METABOLIC PANEL: CPT

## 2019-02-22 PROCEDURE — 85025 COMPLETE CBC W/AUTO DIFF WBC: CPT

## 2019-02-22 PROCEDURE — 87086 URINE CULTURE/COLONY COUNT: CPT

## 2019-02-22 PROCEDURE — 2580000003 HC RX 258: Performed by: PHYSICIAN ASSISTANT

## 2019-02-22 PROCEDURE — 96374 THER/PROPH/DIAG INJ IV PUSH: CPT

## 2019-02-22 PROCEDURE — 6360000002 HC RX W HCPCS: Performed by: PHYSICIAN ASSISTANT

## 2019-02-22 RX ORDER — SODIUM CHLORIDE 9 MG/ML
INJECTION, SOLUTION INTRAVENOUS CONTINUOUS
Status: DISCONTINUED | OUTPATIENT
Start: 2019-02-22 | End: 2019-02-23 | Stop reason: ALTCHOICE

## 2019-02-22 RX ORDER — 0.9 % SODIUM CHLORIDE 0.9 %
1000 INTRAVENOUS SOLUTION INTRAVENOUS ONCE
Status: DISCONTINUED | OUTPATIENT
Start: 2019-02-22 | End: 2019-02-22

## 2019-02-22 RX ORDER — PROMETHAZINE HYDROCHLORIDE 25 MG/1
25 TABLET ORAL EVERY 6 HOURS PRN
Qty: 20 TABLET | Refills: 0 | Status: SHIPPED | OUTPATIENT
Start: 2019-02-22 | End: 2019-02-26 | Stop reason: HOSPADM

## 2019-02-22 RX ORDER — ACETAMINOPHEN 325 MG/1
650 TABLET ORAL ONCE
Status: DISCONTINUED | OUTPATIENT
Start: 2019-02-22 | End: 2019-02-22

## 2019-02-22 RX ORDER — PROMETHAZINE HYDROCHLORIDE 25 MG/ML
25 INJECTION, SOLUTION INTRAMUSCULAR; INTRAVENOUS ONCE
Status: COMPLETED | OUTPATIENT
Start: 2019-02-22 | End: 2019-02-22

## 2019-02-22 RX ORDER — MORPHINE SULFATE 2 MG/ML
2 INJECTION, SOLUTION INTRAMUSCULAR; INTRAVENOUS ONCE
Status: COMPLETED | OUTPATIENT
Start: 2019-02-22 | End: 2019-02-22

## 2019-02-22 RX ORDER — ACETAMINOPHEN 500 MG
1000 TABLET ORAL ONCE
Status: COMPLETED | OUTPATIENT
Start: 2019-02-22 | End: 2019-02-22

## 2019-02-22 RX ADMIN — MORPHINE SULFATE 2 MG: 2 INJECTION, SOLUTION INTRAMUSCULAR; INTRAVENOUS at 20:56

## 2019-02-22 RX ADMIN — PROMETHAZINE HYDROCHLORIDE 25 MG: 25 INJECTION, SOLUTION INTRAMUSCULAR; INTRAVENOUS at 20:02

## 2019-02-22 RX ADMIN — SODIUM CHLORIDE: 9 INJECTION, SOLUTION INTRAVENOUS at 20:00

## 2019-02-22 RX ADMIN — SODIUM CHLORIDE 1000 ML: 9 INJECTION, SOLUTION INTRAVENOUS at 21:03

## 2019-02-22 RX ADMIN — ACETAMINOPHEN 1000 MG: 500 TABLET, FILM COATED ORAL at 21:11

## 2019-02-22 RX ADMIN — SODIUM CHLORIDE: 9 INJECTION, SOLUTION INTRAVENOUS at 21:30

## 2019-02-22 ASSESSMENT — ENCOUNTER SYMPTOMS
ABDOMINAL PAIN: 0
NAUSEA: 1
VOMITING: 1
COUGH: 1

## 2019-02-22 ASSESSMENT — PAIN DESCRIPTION - ORIENTATION: ORIENTATION: ANTERIOR

## 2019-02-22 ASSESSMENT — PAIN DESCRIPTION - PAIN TYPE: TYPE: ACUTE PAIN

## 2019-02-22 ASSESSMENT — PAIN SCALES - GENERAL
PAINLEVEL_OUTOF10: 3
PAINLEVEL_OUTOF10: 7

## 2019-02-22 ASSESSMENT — PAIN DESCRIPTION - LOCATION: LOCATION: CHEST

## 2019-02-23 PROBLEM — O99.012 ANEMIA DURING PREGNANCY IN SECOND TRIMESTER: Status: ACTIVE | Noted: 2019-02-23

## 2019-02-23 PROBLEM — J11.1 INFLUENZA: Status: ACTIVE | Noted: 2019-02-23

## 2019-02-23 PROBLEM — E87.6 HYPOKALEMIA: Status: ACTIVE | Noted: 2019-02-23

## 2019-02-23 PROBLEM — O21.0 HYPEREMESIS GRAVIDARUM: Status: ACTIVE | Noted: 2019-02-23

## 2019-02-23 LAB
ANION GAP SERPL CALCULATED.3IONS-SCNC: 15 MMOL/L (ref 9–17)
BUN BLDV-MCNC: 5 MG/DL (ref 6–20)
BUN/CREAT BLD: 12 (ref 9–20)
CALCIUM SERPL-MCNC: 7.8 MG/DL (ref 8.6–10.4)
CHLORIDE BLD-SCNC: 98 MMOL/L (ref 98–107)
CO2: 17 MMOL/L (ref 20–31)
CREAT SERPL-MCNC: 0.42 MG/DL (ref 0.5–0.9)
GFR AFRICAN AMERICAN: >60 ML/MIN
GFR NON-AFRICAN AMERICAN: >60 ML/MIN
GFR SERPL CREATININE-BSD FRML MDRD: ABNORMAL ML/MIN/{1.73_M2}
GFR SERPL CREATININE-BSD FRML MDRD: ABNORMAL ML/MIN/{1.73_M2}
GLUCOSE BLD-MCNC: 105 MG/DL (ref 70–99)
HCT VFR BLD CALC: 31.4 % (ref 36.3–47.1)
HEMOGLOBIN: 10 G/DL (ref 11.9–15.1)
MCH RBC QN AUTO: 31.4 PG (ref 25.2–33.5)
MCHC RBC AUTO-ENTMCNC: 31.8 G/DL (ref 28.4–34.8)
MCV RBC AUTO: 98.7 FL (ref 82.6–102.9)
NRBC AUTOMATED: 0 PER 100 WBC
PDW BLD-RTO: 13.2 % (ref 11.8–14.4)
PLATELET # BLD: 204 K/UL (ref 138–453)
PMV BLD AUTO: 9.9 FL (ref 8.1–13.5)
POTASSIUM SERPL-SCNC: 3.1 MMOL/L (ref 3.7–5.3)
RBC # BLD: 3.18 M/UL (ref 3.95–5.11)
SODIUM BLD-SCNC: 130 MMOL/L (ref 135–144)
WBC # BLD: 6.5 K/UL (ref 3.5–11.3)

## 2019-02-23 PROCEDURE — 85027 COMPLETE CBC AUTOMATED: CPT

## 2019-02-23 PROCEDURE — 99251 PR INITL INPATIENT CONSULT NEW/ESTAB PT 20 MIN: CPT | Performed by: OBSTETRICS & GYNECOLOGY

## 2019-02-23 PROCEDURE — 6360000002 HC RX W HCPCS: Performed by: OBSTETRICS & GYNECOLOGY

## 2019-02-23 PROCEDURE — 2580000003 HC RX 258: Performed by: FAMILY MEDICINE

## 2019-02-23 PROCEDURE — 36415 COLL VENOUS BLD VENIPUNCTURE: CPT

## 2019-02-23 PROCEDURE — 6370000000 HC RX 637 (ALT 250 FOR IP): Performed by: FAMILY MEDICINE

## 2019-02-23 PROCEDURE — 1200000000 HC SEMI PRIVATE

## 2019-02-23 PROCEDURE — 6360000002 HC RX W HCPCS: Performed by: FAMILY MEDICINE

## 2019-02-23 PROCEDURE — 80048 BASIC METABOLIC PNL TOTAL CA: CPT

## 2019-02-23 RX ORDER — SODIUM CHLORIDE 0.9 % (FLUSH) 0.9 %
10 SYRINGE (ML) INJECTION PRN
Status: DISCONTINUED | OUTPATIENT
Start: 2019-02-23 | End: 2019-02-26 | Stop reason: HOSPADM

## 2019-02-23 RX ORDER — POTASSIUM CHLORIDE AND SODIUM CHLORIDE 900; 300 MG/100ML; MG/100ML
INJECTION, SOLUTION INTRAVENOUS CONTINUOUS
Status: DISCONTINUED | OUTPATIENT
Start: 2019-02-23 | End: 2019-02-25

## 2019-02-23 RX ORDER — POTASSIUM CHLORIDE 7.45 MG/ML
10 INJECTION INTRAVENOUS PRN
Status: DISCONTINUED | OUTPATIENT
Start: 2019-02-23 | End: 2019-02-26 | Stop reason: HOSPADM

## 2019-02-23 RX ORDER — POTASSIUM CHLORIDE 20 MEQ/1
40 TABLET, EXTENDED RELEASE ORAL PRN
Status: DISCONTINUED | OUTPATIENT
Start: 2019-02-23 | End: 2019-02-26 | Stop reason: HOSPADM

## 2019-02-23 RX ORDER — PROMETHAZINE HYDROCHLORIDE 25 MG/ML
25 INJECTION, SOLUTION INTRAMUSCULAR; INTRAVENOUS EVERY 6 HOURS PRN
Status: DISCONTINUED | OUTPATIENT
Start: 2019-02-23 | End: 2019-02-23 | Stop reason: ALTCHOICE

## 2019-02-23 RX ORDER — PROMETHAZINE HYDROCHLORIDE 25 MG/1
25 TABLET ORAL EVERY 6 HOURS PRN
Status: DISCONTINUED | OUTPATIENT
Start: 2019-02-23 | End: 2019-02-26 | Stop reason: HOSPADM

## 2019-02-23 RX ORDER — PROMETHAZINE HYDROCHLORIDE 25 MG/ML
25 INJECTION, SOLUTION INTRAMUSCULAR; INTRAVENOUS EVERY 6 HOURS PRN
Status: DISCONTINUED | OUTPATIENT
Start: 2019-02-23 | End: 2019-02-26 | Stop reason: HOSPADM

## 2019-02-23 RX ORDER — ONDANSETRON 2 MG/ML
8 INJECTION INTRAMUSCULAR; INTRAVENOUS EVERY 8 HOURS PRN
Status: DISCONTINUED | OUTPATIENT
Start: 2019-02-23 | End: 2019-02-26 | Stop reason: HOSPADM

## 2019-02-23 RX ORDER — POTASSIUM CHLORIDE 20MEQ/15ML
40 LIQUID (ML) ORAL PRN
Status: DISCONTINUED | OUTPATIENT
Start: 2019-02-23 | End: 2019-02-26 | Stop reason: HOSPADM

## 2019-02-23 RX ORDER — SODIUM CHLORIDE 9 MG/ML
INJECTION, SOLUTION INTRAVENOUS CONTINUOUS
Status: DISCONTINUED | OUTPATIENT
Start: 2019-02-23 | End: 2019-02-23 | Stop reason: ALTCHOICE

## 2019-02-23 RX ORDER — ACETAMINOPHEN 650 MG/1
650 SUPPOSITORY RECTAL EVERY 4 HOURS PRN
Status: DISCONTINUED | OUTPATIENT
Start: 2019-02-23 | End: 2019-02-26 | Stop reason: HOSPADM

## 2019-02-23 RX ORDER — MORPHINE SULFATE 2 MG/ML
2 INJECTION, SOLUTION INTRAMUSCULAR; INTRAVENOUS EVERY 4 HOURS PRN
Status: DISCONTINUED | OUTPATIENT
Start: 2019-02-23 | End: 2019-02-26 | Stop reason: HOSPADM

## 2019-02-23 RX ORDER — ACETAMINOPHEN 325 MG/1
650 TABLET ORAL EVERY 6 HOURS PRN
Status: DISCONTINUED | OUTPATIENT
Start: 2019-02-23 | End: 2019-02-26 | Stop reason: HOSPADM

## 2019-02-23 RX ORDER — SODIUM CHLORIDE 0.9 % (FLUSH) 0.9 %
10 SYRINGE (ML) INJECTION EVERY 12 HOURS SCHEDULED
Status: DISCONTINUED | OUTPATIENT
Start: 2019-02-23 | End: 2019-02-26 | Stop reason: HOSPADM

## 2019-02-23 RX ADMIN — ONDANSETRON 8 MG: 2 INJECTION INTRAMUSCULAR; INTRAVENOUS at 21:25

## 2019-02-23 RX ADMIN — PROMETHAZINE HYDROCHLORIDE 25 MG: 25 INJECTION, SOLUTION INTRAMUSCULAR; INTRAVENOUS at 17:02

## 2019-02-23 RX ADMIN — SODIUM CHLORIDE AND POTASSIUM CHLORIDE: 9; 2.98 INJECTION, SOLUTION INTRAVENOUS at 22:30

## 2019-02-23 RX ADMIN — MORPHINE SULFATE 2 MG: 2 INJECTION, SOLUTION INTRAMUSCULAR; INTRAVENOUS at 23:50

## 2019-02-23 RX ADMIN — ACETAMINOPHEN 650 MG: 650 SUPPOSITORY RECTAL at 11:58

## 2019-02-23 RX ADMIN — ACETAMINOPHEN 650 MG: 325 TABLET, FILM COATED ORAL at 23:49

## 2019-02-23 RX ADMIN — PROMETHAZINE HYDROCHLORIDE 25 MG: 25 INJECTION, SOLUTION INTRAMUSCULAR; INTRAVENOUS at 09:56

## 2019-02-23 RX ADMIN — PROMETHAZINE HYDROCHLORIDE 25 MG: 25 TABLET ORAL at 09:25

## 2019-02-23 RX ADMIN — MORPHINE SULFATE 2 MG: 2 INJECTION, SOLUTION INTRAMUSCULAR; INTRAVENOUS at 12:46

## 2019-02-23 RX ADMIN — ONDANSETRON 8 MG: 2 INJECTION INTRAMUSCULAR; INTRAVENOUS at 12:46

## 2019-02-23 RX ADMIN — ACETAMINOPHEN 650 MG: 325 TABLET, FILM COATED ORAL at 03:59

## 2019-02-23 RX ADMIN — SODIUM CHLORIDE: 9 INJECTION, SOLUTION INTRAVENOUS at 01:23

## 2019-02-23 RX ADMIN — ACETAMINOPHEN 650 MG: 325 TABLET, FILM COATED ORAL at 16:06

## 2019-02-23 RX ADMIN — CEFTRIAXONE 1 G: 1 INJECTION, POWDER, FOR SOLUTION INTRAMUSCULAR; INTRAVENOUS at 09:15

## 2019-02-23 RX ADMIN — SODIUM CHLORIDE AND POTASSIUM CHLORIDE: 9; 2.98 INJECTION, SOLUTION INTRAVENOUS at 09:15

## 2019-02-23 ASSESSMENT — PAIN DESCRIPTION - LOCATION
LOCATION: HEAD;NECK;BACK
LOCATION: BACK
LOCATION: CHEST
LOCATION: HEAD;NECK
LOCATION: HEAD;BACK;NECK

## 2019-02-23 ASSESSMENT — PAIN DESCRIPTION - ORIENTATION
ORIENTATION: POSTERIOR
ORIENTATION: MID
ORIENTATION: LOWER
ORIENTATION: LOWER

## 2019-02-23 ASSESSMENT — PAIN DESCRIPTION - FREQUENCY
FREQUENCY: CONTINUOUS
FREQUENCY: INTERMITTENT
FREQUENCY: CONTINUOUS
FREQUENCY: INTERMITTENT

## 2019-02-23 ASSESSMENT — PAIN DESCRIPTION - DESCRIPTORS
DESCRIPTORS: SORE
DESCRIPTORS: ACHING
DESCRIPTORS: SHOOTING;THROBBING
DESCRIPTORS: ACHING
DESCRIPTORS: ACHING

## 2019-02-23 ASSESSMENT — PAIN SCALES - GENERAL
PAINLEVEL_OUTOF10: 0
PAINLEVEL_OUTOF10: 5
PAINLEVEL_OUTOF10: 0
PAINLEVEL_OUTOF10: 4
PAINLEVEL_OUTOF10: 4
PAINLEVEL_OUTOF10: 7
PAINLEVEL_OUTOF10: 6

## 2019-02-23 ASSESSMENT — PAIN DESCRIPTION - PAIN TYPE
TYPE: ACUTE PAIN

## 2019-02-24 ENCOUNTER — APPOINTMENT (OUTPATIENT)
Dept: GENERAL RADIOLOGY | Age: 29
DRG: 831 | End: 2019-02-24
Payer: COMMERCIAL

## 2019-02-24 PROBLEM — E87.1 HYPONATREMIA: Status: ACTIVE | Noted: 2019-02-24

## 2019-02-24 PROBLEM — J18.9 PNEUMONIA: Status: ACTIVE | Noted: 2019-02-24

## 2019-02-24 LAB
ALBUMIN SERPL-MCNC: 2.9 G/DL (ref 3.5–5.2)
ALBUMIN/GLOBULIN RATIO: 0.9 (ref 1–2.5)
ALP BLD-CCNC: 105 U/L (ref 35–104)
ALT SERPL-CCNC: 8 U/L (ref 5–33)
ANION GAP SERPL CALCULATED.3IONS-SCNC: 15 MMOL/L (ref 9–17)
ANION GAP SERPL CALCULATED.3IONS-SCNC: 15 MMOL/L (ref 9–17)
AST SERPL-CCNC: 22 U/L
BILIRUB SERPL-MCNC: 0.92 MG/DL (ref 0.3–1.2)
BUN BLDV-MCNC: 3 MG/DL (ref 6–20)
BUN BLDV-MCNC: 4 MG/DL (ref 6–20)
BUN/CREAT BLD: 10 (ref 9–20)
BUN/CREAT BLD: 8 (ref 9–20)
CALCIUM SERPL-MCNC: 8 MG/DL (ref 8.6–10.4)
CALCIUM SERPL-MCNC: 8.1 MG/DL (ref 8.6–10.4)
CHLORIDE BLD-SCNC: 102 MMOL/L (ref 98–107)
CHLORIDE BLD-SCNC: 104 MMOL/L (ref 98–107)
CO2: 11 MMOL/L (ref 20–31)
CO2: 14 MMOL/L (ref 20–31)
CREAT SERPL-MCNC: 0.36 MG/DL (ref 0.5–0.9)
CREAT SERPL-MCNC: 0.41 MG/DL (ref 0.5–0.9)
CULTURE: NORMAL
DIRECT EXAM: NORMAL
GFR AFRICAN AMERICAN: >60 ML/MIN
GFR AFRICAN AMERICAN: >60 ML/MIN
GFR NON-AFRICAN AMERICAN: >60 ML/MIN
GFR NON-AFRICAN AMERICAN: >60 ML/MIN
GFR SERPL CREATININE-BSD FRML MDRD: ABNORMAL ML/MIN/{1.73_M2}
GLUCOSE BLD-MCNC: 76 MG/DL (ref 70–99)
GLUCOSE BLD-MCNC: 89 MG/DL (ref 70–99)
HCT VFR BLD CALC: 29.5 % (ref 36.3–47.1)
HEMOGLOBIN: 9.3 G/DL (ref 11.9–15.1)
HIGH SENSITIVE C-REACTIVE PROTEIN: 196.9 MG/L
LACTIC ACID, WHOLE BLOOD: NORMAL MMOL/L (ref 0.7–2.1)
LACTIC ACID: 0.6 MMOL/L (ref 0.5–2.2)
Lab: NORMAL
Lab: NORMAL
MCH RBC QN AUTO: 32.1 PG (ref 25.2–33.5)
MCHC RBC AUTO-ENTMCNC: 31.5 G/DL (ref 28.4–34.8)
MCV RBC AUTO: 101.7 FL (ref 82.6–102.9)
NRBC AUTOMATED: 0 PER 100 WBC
PDW BLD-RTO: 13.3 % (ref 11.8–14.4)
PLATELET # BLD: 212 K/UL (ref 138–453)
PMV BLD AUTO: 10.3 FL (ref 8.1–13.5)
POTASSIUM SERPL-SCNC: 3.7 MMOL/L (ref 3.7–5.3)
POTASSIUM SERPL-SCNC: 3.9 MMOL/L (ref 3.7–5.3)
RBC # BLD: 2.9 M/UL (ref 3.95–5.11)
SEDIMENTATION RATE, ERYTHROCYTE: 95 MM (ref 0–20)
SODIUM BLD-SCNC: 128 MMOL/L (ref 135–144)
SODIUM BLD-SCNC: 133 MMOL/L (ref 135–144)
SPECIMEN DESCRIPTION: NORMAL
SPECIMEN DESCRIPTION: NORMAL
TOTAL PROTEIN: 6 G/DL (ref 6.4–8.3)
WBC # BLD: 7.5 K/UL (ref 3.5–11.3)

## 2019-02-24 PROCEDURE — 94640 AIRWAY INHALATION TREATMENT: CPT

## 2019-02-24 PROCEDURE — 87040 BLOOD CULTURE FOR BACTERIA: CPT

## 2019-02-24 PROCEDURE — 87899 AGENT NOS ASSAY W/OPTIC: CPT

## 2019-02-24 PROCEDURE — 6370000000 HC RX 637 (ALT 250 FOR IP): Performed by: OBSTETRICS & GYNECOLOGY

## 2019-02-24 PROCEDURE — 6370000000 HC RX 637 (ALT 250 FOR IP): Performed by: FAMILY MEDICINE

## 2019-02-24 PROCEDURE — 6360000002 HC RX W HCPCS: Performed by: FAMILY MEDICINE

## 2019-02-24 PROCEDURE — 83605 ASSAY OF LACTIC ACID: CPT

## 2019-02-24 PROCEDURE — 2580000003 HC RX 258: Performed by: FAMILY MEDICINE

## 2019-02-24 PROCEDURE — 1200000000 HC SEMI PRIVATE

## 2019-02-24 PROCEDURE — 86157 COLD AGGLUTININ TITER: CPT

## 2019-02-24 PROCEDURE — 71046 X-RAY EXAM CHEST 2 VIEWS: CPT

## 2019-02-24 PROCEDURE — 86141 C-REACTIVE PROTEIN HS: CPT

## 2019-02-24 PROCEDURE — 85027 COMPLETE CBC AUTOMATED: CPT

## 2019-02-24 PROCEDURE — 6360000002 HC RX W HCPCS: Performed by: OBSTETRICS & GYNECOLOGY

## 2019-02-24 PROCEDURE — 80053 COMPREHEN METABOLIC PANEL: CPT

## 2019-02-24 PROCEDURE — 85651 RBC SED RATE NONAUTOMATED: CPT

## 2019-02-24 PROCEDURE — 36415 COLL VENOUS BLD VENIPUNCTURE: CPT

## 2019-02-24 PROCEDURE — 80048 BASIC METABOLIC PNL TOTAL CA: CPT

## 2019-02-24 RX ORDER — ALBUTEROL SULFATE 2.5 MG/3ML
2.5 SOLUTION RESPIRATORY (INHALATION) EVERY 6 HOURS PRN
Status: DISCONTINUED | OUTPATIENT
Start: 2019-02-24 | End: 2019-02-26 | Stop reason: HOSPADM

## 2019-02-24 RX ORDER — FERROUS SULFATE 325(65) MG
325 TABLET ORAL 2 TIMES DAILY WITH MEALS
Status: DISCONTINUED | OUTPATIENT
Start: 2019-02-24 | End: 2019-02-26 | Stop reason: HOSPADM

## 2019-02-24 RX ORDER — PRENATAL WITH FERROUS FUM AND FOLIC ACID 3080; 920; 120; 400; 22; 1.84; 3; 20; 10; 1; 12; 200; 27; 25; 2 [IU]/1; [IU]/1; MG/1; [IU]/1; MG/1; MG/1; MG/1; MG/1; MG/1; MG/1; UG/1; MG/1; MG/1; MG/1; MG/1
1 TABLET ORAL DAILY
Status: DISCONTINUED | OUTPATIENT
Start: 2019-02-24 | End: 2019-02-26 | Stop reason: HOSPADM

## 2019-02-24 RX ORDER — PRENATAL WITH FERROUS FUM AND FOLIC ACID 3080; 920; 120; 400; 22; 1.84; 3; 20; 10; 1; 12; 200; 27; 25; 2 [IU]/1; [IU]/1; MG/1; [IU]/1; MG/1; MG/1; MG/1; MG/1; MG/1; MG/1; UG/1; MG/1; MG/1; MG/1; MG/1
1 TABLET ORAL DAILY
Status: DISCONTINUED | OUTPATIENT
Start: 2019-02-24 | End: 2019-02-24

## 2019-02-24 RX ORDER — 0.9 % SODIUM CHLORIDE 0.9 %
500 INTRAVENOUS SOLUTION INTRAVENOUS ONCE
Status: COMPLETED | OUTPATIENT
Start: 2019-02-24 | End: 2019-02-24

## 2019-02-24 RX ADMIN — ACETAMINOPHEN 650 MG: 325 TABLET, FILM COATED ORAL at 18:03

## 2019-02-24 RX ADMIN — ALBUTEROL SULFATE 2.5 MG: 2.5 SOLUTION RESPIRATORY (INHALATION) at 19:30

## 2019-02-24 RX ADMIN — PROMETHAZINE HYDROCHLORIDE 25 MG: 25 TABLET ORAL at 15:49

## 2019-02-24 RX ADMIN — ONDANSETRON 8 MG: 2 INJECTION INTRAMUSCULAR; INTRAVENOUS at 19:17

## 2019-02-24 RX ADMIN — SODIUM CHLORIDE AND POTASSIUM CHLORIDE: 9; 2.98 INJECTION, SOLUTION INTRAVENOUS at 15:43

## 2019-02-24 RX ADMIN — AZITHROMYCIN MONOHYDRATE 500 MG: 500 INJECTION, POWDER, LYOPHILIZED, FOR SOLUTION INTRAVENOUS at 15:42

## 2019-02-24 RX ADMIN — SODIUM CHLORIDE 500 ML: 9 INJECTION, SOLUTION INTRAVENOUS at 14:00

## 2019-02-24 RX ADMIN — WATER 1 G: 1 INJECTION INTRAMUSCULAR; INTRAVENOUS; SUBCUTANEOUS at 15:41

## 2019-02-24 RX ADMIN — ACETAMINOPHEN 650 MG: 325 TABLET, FILM COATED ORAL at 11:09

## 2019-02-24 RX ADMIN — ONDANSETRON 8 MG: 2 INJECTION INTRAMUSCULAR; INTRAVENOUS at 10:17

## 2019-02-24 RX ADMIN — VITAMIN A, VITAMIN C, VITAMIN D-3, VITAMIN E, VITAMIN B-1, VITAMIN B-2, NIACIN, VITAMIN B-6, CALCIUM, IRON, ZINC, COPPER 1 TABLET: 4000; 120; 400; 22; 1.84; 3; 20; 10; 1; 12; 200; 27; 25; 2 TABLET ORAL at 17:58

## 2019-02-24 RX ADMIN — MORPHINE SULFATE 2 MG: 2 INJECTION, SOLUTION INTRAMUSCULAR; INTRAVENOUS at 15:49

## 2019-02-24 RX ADMIN — MORPHINE SULFATE 2 MG: 2 INJECTION, SOLUTION INTRAMUSCULAR; INTRAVENOUS at 11:09

## 2019-02-24 RX ADMIN — FERROUS SULFATE TAB 325 MG (65 MG ELEMENTAL FE) 325 MG: 325 (65 FE) TAB at 15:51

## 2019-02-24 ASSESSMENT — PAIN SCALES - GENERAL
PAINLEVEL_OUTOF10: 5
PAINLEVEL_OUTOF10: 6
PAINLEVEL_OUTOF10: 4
PAINLEVEL_OUTOF10: 3
PAINLEVEL_OUTOF10: 0
PAINLEVEL_OUTOF10: 6

## 2019-02-24 ASSESSMENT — PAIN DESCRIPTION - LOCATION
LOCATION: GENERALIZED
LOCATION: GENERALIZED

## 2019-02-24 ASSESSMENT — PAIN DESCRIPTION - DESCRIPTORS: DESCRIPTORS: ACHING

## 2019-02-24 ASSESSMENT — PAIN DESCRIPTION - FREQUENCY: FREQUENCY: CONTINUOUS

## 2019-02-24 ASSESSMENT — PAIN DESCRIPTION - PAIN TYPE
TYPE: ACUTE PAIN
TYPE: ACUTE PAIN

## 2019-02-25 ENCOUNTER — APPOINTMENT (OUTPATIENT)
Dept: ULTRASOUND IMAGING | Age: 29
DRG: 831 | End: 2019-02-25
Payer: COMMERCIAL

## 2019-02-25 LAB
ALBUMIN SERPL-MCNC: 2.7 G/DL (ref 3.5–5.2)
ALBUMIN/GLOBULIN RATIO: 0.9 (ref 1–2.5)
ALP BLD-CCNC: 106 U/L (ref 35–104)
ALT SERPL-CCNC: 11 U/L (ref 5–33)
ANION GAP SERPL CALCULATED.3IONS-SCNC: 13 MMOL/L (ref 9–17)
ANION GAP SERPL CALCULATED.3IONS-SCNC: 14 MMOL/L (ref 9–17)
AST SERPL-CCNC: 19 U/L
BILIRUB SERPL-MCNC: 0.87 MG/DL (ref 0.3–1.2)
BUN BLDV-MCNC: 2 MG/DL (ref 6–20)
BUN BLDV-MCNC: 3 MG/DL (ref 6–20)
BUN/CREAT BLD: 6 (ref 9–20)
BUN/CREAT BLD: 9 (ref 9–20)
CALCIUM SERPL-MCNC: 7.9 MG/DL (ref 8.6–10.4)
CALCIUM SERPL-MCNC: 8.4 MG/DL (ref 8.6–10.4)
CHLORIDE BLD-SCNC: 102 MMOL/L (ref 98–107)
CHLORIDE BLD-SCNC: 104 MMOL/L (ref 98–107)
CO2: 13 MMOL/L (ref 20–31)
CO2: 19 MMOL/L (ref 20–31)
CREAT SERPL-MCNC: 0.32 MG/DL (ref 0.5–0.9)
CREAT SERPL-MCNC: 0.35 MG/DL (ref 0.5–0.9)
GFR AFRICAN AMERICAN: >60 ML/MIN
GFR AFRICAN AMERICAN: >60 ML/MIN
GFR NON-AFRICAN AMERICAN: >60 ML/MIN
GFR NON-AFRICAN AMERICAN: >60 ML/MIN
GFR SERPL CREATININE-BSD FRML MDRD: ABNORMAL ML/MIN/{1.73_M2}
GLUCOSE BLD-MCNC: 117 MG/DL (ref 70–99)
GLUCOSE BLD-MCNC: 79 MG/DL (ref 70–99)
HCT VFR BLD CALC: 27.4 % (ref 36.3–47.1)
HEMOGLOBIN: 8.8 G/DL (ref 11.9–15.1)
MCH RBC QN AUTO: 31.9 PG (ref 25.2–33.5)
MCHC RBC AUTO-ENTMCNC: 32.1 G/DL (ref 28.4–34.8)
MCV RBC AUTO: 99.3 FL (ref 82.6–102.9)
NRBC AUTOMATED: 0 PER 100 WBC
PDW BLD-RTO: 13.4 % (ref 11.8–14.4)
PLATELET # BLD: 193 K/UL (ref 138–453)
PMV BLD AUTO: 10.1 FL (ref 8.1–13.5)
POTASSIUM SERPL-SCNC: 3.6 MMOL/L (ref 3.7–5.3)
POTASSIUM SERPL-SCNC: 3.8 MMOL/L (ref 3.7–5.3)
RBC # BLD: 2.76 M/UL (ref 3.95–5.11)
SODIUM BLD-SCNC: 129 MMOL/L (ref 135–144)
SODIUM BLD-SCNC: 136 MMOL/L (ref 135–144)
TOTAL PROTEIN: 5.6 G/DL (ref 6.4–8.3)
WBC # BLD: 6 K/UL (ref 3.5–11.3)

## 2019-02-25 PROCEDURE — 76705 ECHO EXAM OF ABDOMEN: CPT

## 2019-02-25 PROCEDURE — 6360000002 HC RX W HCPCS: Performed by: OBSTETRICS & GYNECOLOGY

## 2019-02-25 PROCEDURE — 85027 COMPLETE CBC AUTOMATED: CPT

## 2019-02-25 PROCEDURE — 80048 BASIC METABOLIC PNL TOTAL CA: CPT

## 2019-02-25 PROCEDURE — 80053 COMPREHEN METABOLIC PANEL: CPT

## 2019-02-25 PROCEDURE — 2580000003 HC RX 258: Performed by: FAMILY MEDICINE

## 2019-02-25 PROCEDURE — 36415 COLL VENOUS BLD VENIPUNCTURE: CPT

## 2019-02-25 PROCEDURE — 99232 SBSQ HOSP IP/OBS MODERATE 35: CPT | Performed by: FAMILY MEDICINE

## 2019-02-25 PROCEDURE — 6370000000 HC RX 637 (ALT 250 FOR IP): Performed by: FAMILY MEDICINE

## 2019-02-25 PROCEDURE — 6360000002 HC RX W HCPCS: Performed by: FAMILY MEDICINE

## 2019-02-25 PROCEDURE — 1200000000 HC SEMI PRIVATE

## 2019-02-25 RX ORDER — BISACODYL 10 MG
10 SUPPOSITORY, RECTAL RECTAL ONCE
Status: COMPLETED | OUTPATIENT
Start: 2019-02-25 | End: 2019-02-25

## 2019-02-25 RX ADMIN — ACETAMINOPHEN 650 MG: 325 TABLET, FILM COATED ORAL at 07:48

## 2019-02-25 RX ADMIN — ONDANSETRON 8 MG: 2 INJECTION INTRAMUSCULAR; INTRAVENOUS at 07:48

## 2019-02-25 RX ADMIN — PROMETHAZINE HYDROCHLORIDE 25 MG: 25 TABLET ORAL at 13:43

## 2019-02-25 RX ADMIN — MORPHINE SULFATE 2 MG: 2 INJECTION, SOLUTION INTRAMUSCULAR; INTRAVENOUS at 00:38

## 2019-02-25 RX ADMIN — BISACODYL 10 MG: 10 SUPPOSITORY RECTAL at 11:50

## 2019-02-25 RX ADMIN — ACETAMINOPHEN 650 MG: 325 TABLET, FILM COATED ORAL at 21:49

## 2019-02-25 RX ADMIN — AZITHROMYCIN MONOHYDRATE 500 MG: 500 INJECTION, POWDER, LYOPHILIZED, FOR SOLUTION INTRAVENOUS at 13:44

## 2019-02-25 RX ADMIN — Medication 10 ML: at 21:49

## 2019-02-25 RX ADMIN — ACETAMINOPHEN 650 MG: 325 TABLET, FILM COATED ORAL at 14:48

## 2019-02-25 RX ADMIN — ALBUTEROL SULFATE 2.5 MG: 2.5 SOLUTION RESPIRATORY (INHALATION) at 09:14

## 2019-02-25 RX ADMIN — WATER 1 G: 1 INJECTION INTRAMUSCULAR; INTRAVENOUS; SUBCUTANEOUS at 13:44

## 2019-02-25 ASSESSMENT — PAIN SCALES - GENERAL
PAINLEVEL_OUTOF10: 3
PAINLEVEL_OUTOF10: 3
PAINLEVEL_OUTOF10: 1
PAINLEVEL_OUTOF10: 6
PAINLEVEL_OUTOF10: 5
PAINLEVEL_OUTOF10: 3
PAINLEVEL_OUTOF10: 3

## 2019-02-25 ASSESSMENT — PAIN DESCRIPTION - LOCATION
LOCATION: GENERALIZED
LOCATION: HEAD
LOCATION: HEAD

## 2019-02-25 ASSESSMENT — PAIN DESCRIPTION - PAIN TYPE
TYPE: ACUTE PAIN

## 2019-02-25 ASSESSMENT — PAIN DESCRIPTION - FREQUENCY: FREQUENCY: CONTINUOUS

## 2019-02-25 ASSESSMENT — PAIN DESCRIPTION - DESCRIPTORS
DESCRIPTORS: ACHING
DESCRIPTORS: DULL
DESCRIPTORS: DULL

## 2019-02-26 VITALS
HEIGHT: 63 IN | BODY MASS INDEX: 25.89 KG/M2 | WEIGHT: 146.1 LBS | DIASTOLIC BLOOD PRESSURE: 72 MMHG | OXYGEN SATURATION: 93 % | SYSTOLIC BLOOD PRESSURE: 118 MMHG | TEMPERATURE: 98.2 F | RESPIRATION RATE: 18 BRPM | HEART RATE: 101 BPM

## 2019-02-26 LAB
HCT VFR BLD CALC: 28.4 % (ref 36.3–47.1)
HEMOGLOBIN: 9.2 G/DL (ref 11.9–15.1)
MCH RBC QN AUTO: 31.7 PG (ref 25.2–33.5)
MCHC RBC AUTO-ENTMCNC: 32.4 G/DL (ref 28.4–34.8)
MCV RBC AUTO: 97.9 FL (ref 82.6–102.9)
NRBC AUTOMATED: 0 PER 100 WBC
PDW BLD-RTO: 13.2 % (ref 11.8–14.4)
PLATELET # BLD: 230 K/UL (ref 138–453)
PMV BLD AUTO: 9.5 FL (ref 8.1–13.5)
RBC # BLD: 2.9 M/UL (ref 3.95–5.11)
WBC # BLD: 4.9 K/UL (ref 3.5–11.3)

## 2019-02-26 PROCEDURE — 2580000003 HC RX 258: Performed by: FAMILY MEDICINE

## 2019-02-26 PROCEDURE — 6360000002 HC RX W HCPCS: Performed by: FAMILY MEDICINE

## 2019-02-26 PROCEDURE — 36415 COLL VENOUS BLD VENIPUNCTURE: CPT

## 2019-02-26 PROCEDURE — 99239 HOSP IP/OBS DSCHRG MGMT >30: CPT | Performed by: FAMILY MEDICINE

## 2019-02-26 PROCEDURE — 6370000000 HC RX 637 (ALT 250 FOR IP): Performed by: OBSTETRICS & GYNECOLOGY

## 2019-02-26 PROCEDURE — 6370000000 HC RX 637 (ALT 250 FOR IP): Performed by: FAMILY MEDICINE

## 2019-02-26 PROCEDURE — 94640 AIRWAY INHALATION TREATMENT: CPT

## 2019-02-26 PROCEDURE — 85027 COMPLETE CBC AUTOMATED: CPT

## 2019-02-26 RX ORDER — CEFDINIR 300 MG/1
300 CAPSULE ORAL 2 TIMES DAILY
Qty: 20 CAPSULE | Refills: 0 | Status: SHIPPED | OUTPATIENT
Start: 2019-02-26 | End: 2019-03-11 | Stop reason: ALTCHOICE

## 2019-02-26 RX ORDER — LEVALBUTEROL TARTRATE 45 UG/1
2 AEROSOL, METERED ORAL EVERY 6 HOURS PRN
Status: DISCONTINUED | OUTPATIENT
Start: 2019-02-26 | End: 2019-02-26 | Stop reason: ALTCHOICE

## 2019-02-26 RX ORDER — FERROUS SULFATE 325(65) MG
325 TABLET ORAL 2 TIMES DAILY WITH MEALS
Qty: 30 TABLET | Refills: 3 | Status: SHIPPED | OUTPATIENT
Start: 2019-02-26 | End: 2019-09-10

## 2019-02-26 RX ORDER — LEVALBUTEROL TARTRATE 45 UG/1
1-2 AEROSOL, METERED ORAL EVERY 4 HOURS PRN
Qty: 1 INHALER | Refills: 3 | Status: SHIPPED | OUTPATIENT
Start: 2019-02-26 | End: 2019-09-10 | Stop reason: ALTCHOICE

## 2019-02-26 RX ORDER — LEVALBUTEROL INHALATION SOLUTION 0.63 MG/3ML
0.63 SOLUTION RESPIRATORY (INHALATION) EVERY 6 HOURS PRN
Status: DISCONTINUED | OUTPATIENT
Start: 2019-02-26 | End: 2019-02-26 | Stop reason: HOSPADM

## 2019-02-26 RX ADMIN — LEVALBUTEROL HYDROCHLORIDE 0.63 MG: 0.63 SOLUTION RESPIRATORY (INHALATION) at 08:10

## 2019-02-26 RX ADMIN — ACETAMINOPHEN 650 MG: 325 TABLET, FILM COATED ORAL at 05:48

## 2019-02-26 RX ADMIN — ACETAMINOPHEN 650 MG: 325 TABLET, FILM COATED ORAL at 10:41

## 2019-02-26 RX ADMIN — Medication 10 ML: at 08:40

## 2019-02-26 RX ADMIN — VITAMIN A, VITAMIN C, VITAMIN D-3, VITAMIN E, VITAMIN B-1, VITAMIN B-2, NIACIN, VITAMIN B-6, CALCIUM, IRON, ZINC, COPPER 1 TABLET: 4000; 120; 400; 22; 1.84; 3; 20; 10; 1; 12; 200; 27; 25; 2 TABLET ORAL at 08:39

## 2019-02-26 RX ADMIN — WATER 1 G: 1 INJECTION INTRAMUSCULAR; INTRAVENOUS; SUBCUTANEOUS at 12:16

## 2019-02-26 RX ADMIN — AZITHROMYCIN MONOHYDRATE 500 MG: 500 INJECTION, POWDER, LYOPHILIZED, FOR SOLUTION INTRAVENOUS at 12:26

## 2019-02-26 RX ADMIN — PROMETHAZINE HYDROCHLORIDE 25 MG: 25 TABLET ORAL at 13:02

## 2019-02-26 RX ADMIN — FERROUS SULFATE TAB 325 MG (65 MG ELEMENTAL FE) 325 MG: 325 (65 FE) TAB at 08:39

## 2019-02-26 ASSESSMENT — PAIN DESCRIPTION - ORIENTATION: ORIENTATION: OTHER (COMMENT)

## 2019-02-26 ASSESSMENT — PAIN SCALES - GENERAL
PAINLEVEL_OUTOF10: 1
PAINLEVEL_OUTOF10: 3
PAINLEVEL_OUTOF10: 4
PAINLEVEL_OUTOF10: 4
PAINLEVEL_OUTOF10: 5

## 2019-02-26 ASSESSMENT — PAIN DESCRIPTION - DESCRIPTORS
DESCRIPTORS: HEADACHE
DESCRIPTORS: HEADACHE

## 2019-02-26 ASSESSMENT — PAIN DESCRIPTION - LOCATION
LOCATION: HEAD
LOCATION: HEAD

## 2019-02-26 ASSESSMENT — PAIN DESCRIPTION - PAIN TYPE
TYPE: ACUTE PAIN
TYPE: ACUTE PAIN

## 2019-02-26 ASSESSMENT — PAIN DESCRIPTION - FREQUENCY: FREQUENCY: CONTINUOUS

## 2019-02-26 ASSESSMENT — PAIN DESCRIPTION - PROGRESSION
CLINICAL_PROGRESSION: GRADUALLY WORSENING
CLINICAL_PROGRESSION: GRADUALLY IMPROVING

## 2019-02-28 ENCOUNTER — TELEPHONE (OUTPATIENT)
Dept: OBGYN | Age: 29
End: 2019-02-28

## 2019-02-28 DIAGNOSIS — B37.31 VAGINAL YEAST INFECTION: Primary | ICD-10-CM

## 2019-02-28 LAB — COLD AGGLUTININ TITER: NORMAL

## 2019-03-01 ENCOUNTER — OFFICE VISIT (OUTPATIENT)
Dept: FAMILY MEDICINE CLINIC | Age: 29
End: 2019-03-01
Payer: COMMERCIAL

## 2019-03-01 VITALS
HEART RATE: 113 BPM | DIASTOLIC BLOOD PRESSURE: 58 MMHG | OXYGEN SATURATION: 94 % | WEIGHT: 146 LBS | HEIGHT: 63 IN | SYSTOLIC BLOOD PRESSURE: 120 MMHG | BODY MASS INDEX: 25.87 KG/M2

## 2019-03-01 DIAGNOSIS — J10.1 INFLUENZA B: Primary | ICD-10-CM

## 2019-03-01 DIAGNOSIS — J45.21 EXACERBATION OF RAD (REACTIVE AIRWAY DISEASE), MILD INTERMITTENT: ICD-10-CM

## 2019-03-01 DIAGNOSIS — J18.9 PNEUMONIA OF RIGHT LOWER LOBE DUE TO INFECTIOUS ORGANISM: ICD-10-CM

## 2019-03-01 LAB
CULTURE: NORMAL
CULTURE: NORMAL
Lab: NORMAL
Lab: NORMAL
SPECIMEN DESCRIPTION: NORMAL
SPECIMEN DESCRIPTION: NORMAL

## 2019-03-01 PROCEDURE — 99214 OFFICE O/P EST MOD 30 MIN: CPT | Performed by: FAMILY MEDICINE

## 2019-03-11 ENCOUNTER — OFFICE VISIT (OUTPATIENT)
Dept: FAMILY MEDICINE CLINIC | Age: 29
End: 2019-03-11
Payer: COMMERCIAL

## 2019-03-11 VITALS
SYSTOLIC BLOOD PRESSURE: 106 MMHG | WEIGHT: 146 LBS | DIASTOLIC BLOOD PRESSURE: 60 MMHG | BODY MASS INDEX: 25.87 KG/M2 | HEIGHT: 63 IN

## 2019-03-11 DIAGNOSIS — J18.9 PNEUMONIA OF LEFT LOWER LOBE DUE TO INFECTIOUS ORGANISM: Primary | ICD-10-CM

## 2019-03-11 PROCEDURE — 99213 OFFICE O/P EST LOW 20 MIN: CPT | Performed by: FAMILY MEDICINE

## 2019-03-13 ENCOUNTER — ROUTINE PRENATAL (OUTPATIENT)
Dept: OBGYN | Age: 29
End: 2019-03-13
Payer: COMMERCIAL

## 2019-03-13 VITALS — BODY MASS INDEX: 26.04 KG/M2 | WEIGHT: 147 LBS | SYSTOLIC BLOOD PRESSURE: 112 MMHG | DIASTOLIC BLOOD PRESSURE: 60 MMHG

## 2019-03-13 DIAGNOSIS — Z3A.24 24 WEEKS GESTATION OF PREGNANCY: ICD-10-CM

## 2019-03-13 DIAGNOSIS — Z34.82 ENCOUNTER FOR SUPERVISION OF OTHER NORMAL PREGNANCY IN SECOND TRIMESTER: ICD-10-CM

## 2019-03-13 DIAGNOSIS — O99.512 PNEUMONIA AFFECTING PREGNANCY IN SECOND TRIMESTER: ICD-10-CM

## 2019-03-13 DIAGNOSIS — J18.9 PNEUMONIA AFFECTING PREGNANCY IN SECOND TRIMESTER: ICD-10-CM

## 2019-03-13 DIAGNOSIS — D50.8 OTHER IRON DEFICIENCY ANEMIA: Primary | ICD-10-CM

## 2019-03-13 PROBLEM — D64.9 ABSOLUTE ANEMIA: Status: ACTIVE | Noted: 2019-03-13

## 2019-03-13 LAB
ABDOMINAL CIRCUMFERENCE: 20.4 CM
BIPARIETAL DIAMETER: 6.4 CM
ESTIMATED FETAL WEIGHT: 775 GRAMS
FEMORAL DIAMETER: NORMAL CM
HC/AC: 1.16
HEAD CIRCUMFERENCE: 23.7 CM

## 2019-03-13 PROCEDURE — 76816 OB US FOLLOW-UP PER FETUS: CPT | Performed by: OBSTETRICS & GYNECOLOGY

## 2019-03-13 PROCEDURE — 0502F SUBSEQUENT PRENATAL CARE: CPT | Performed by: ADVANCED PRACTICE MIDWIFE

## 2019-03-14 RX ORDER — MONTELUKAST SODIUM 10 MG/1
10 TABLET ORAL NIGHTLY
Qty: 30 TABLET | Refills: 1 | Status: SHIPPED | OUTPATIENT
Start: 2019-03-14 | End: 2019-10-16 | Stop reason: ALTCHOICE

## 2019-03-25 ENCOUNTER — OFFICE VISIT (OUTPATIENT)
Dept: FAMILY MEDICINE CLINIC | Age: 29
End: 2019-03-25
Payer: COMMERCIAL

## 2019-03-25 VITALS
BODY MASS INDEX: 27 KG/M2 | SYSTOLIC BLOOD PRESSURE: 100 MMHG | DIASTOLIC BLOOD PRESSURE: 54 MMHG | HEIGHT: 63 IN | WEIGHT: 152.4 LBS

## 2019-03-25 DIAGNOSIS — J18.9 PNEUMONIA OF RIGHT LOWER LOBE DUE TO INFECTIOUS ORGANISM: Primary | ICD-10-CM

## 2019-03-25 PROCEDURE — 99213 OFFICE O/P EST LOW 20 MIN: CPT | Performed by: FAMILY MEDICINE

## 2019-04-04 ENCOUNTER — ROUTINE PRENATAL (OUTPATIENT)
Dept: OBGYN | Age: 29
End: 2019-04-04

## 2019-04-04 VITALS — WEIGHT: 152 LBS | BODY MASS INDEX: 26.93 KG/M2 | SYSTOLIC BLOOD PRESSURE: 122 MMHG | DIASTOLIC BLOOD PRESSURE: 62 MMHG

## 2019-04-04 DIAGNOSIS — O99.810 ABNORMAL GLUCOSE AFFECTING PREGNANCY: Primary | ICD-10-CM

## 2019-04-04 DIAGNOSIS — Z3A.28 28 WEEKS GESTATION OF PREGNANCY: ICD-10-CM

## 2019-04-04 DIAGNOSIS — O92.70 LACTATION DISORDER: Primary | ICD-10-CM

## 2019-04-04 DIAGNOSIS — Z34.83 ENCOUNTER FOR SUPERVISION OF OTHER NORMAL PREGNANCY IN THIRD TRIMESTER: ICD-10-CM

## 2019-04-04 LAB
GLUCOSE BLD-MCNC: 148 MG/DL
HGB, POC: 9.2

## 2019-04-04 PROCEDURE — 0502F SUBSEQUENT PRENATAL CARE: CPT | Performed by: ADVANCED PRACTICE MIDWIFE

## 2019-04-04 RX ORDER — BREAST PUMP
EACH MISCELLANEOUS
Qty: 1 EACH | Refills: 0 | Status: SHIPPED | OUTPATIENT
Start: 2019-04-04 | End: 2019-10-16 | Stop reason: ALTCHOICE

## 2019-04-04 NOTE — PROGRESS NOTES
Carmencita Thomas is here at 28w0d for:    Chief Complaint   Patient presents with    Routine Prenatal Visit     1 hour GTT. today. Needs prescription for breast pump. Estimated Due Date: Estimated Date of Delivery: 19    OB History    Para Term  AB Living   4 2 2   1 2   SAB TAB Ectopic Molar Multiple Live Births   1         2      # Outcome Date GA Lbr Robin/2nd Weight Sex Delivery Anes PTL Lv   4 Current            3 Term 14 39w5d 07:01 8 lb 6.1 oz (3.802 kg) F Vag-Spont EPI  VIOLETA   2 SAB  9w0d             Birth Comments: 13 week D&C   1 Term 11 41w0d  9 lb 4 oz (4.196 kg) M Vag-Spont EPI N VIOLETA        Past Medical History:   Diagnosis Date    Anemia during pregnancy in second trimester 2019    Breast disorder     Netta Luis infection     Exacerbation of rad (reactive airway disease), mild intermittent 3/1/2019       Past Surgical History:   Procedure Laterality Date    DILATION AND CURETTAGE      DILATION AND CURETTAGE OF UTERUS  13    missed        Social History     Tobacco Use   Smoking Status Never Smoker   Smokeless Tobacco Never Used        Social History     Substance and Sexual Activity   Alcohol Use No       No results found for this visit on 19. Vitals:  /62   Wt 152 lb (68.9 kg)   LMP 2018 (Approximate)   BMI 26.93 kg/m²   Estimated body mass index is 26.93 kg/m² as calculated from the following:    Height as of 3/25/19: 5' 3\" (1.6 m). Weight as of this encounter: 152 lb (68.9 kg). HPI: here for routine ob visit and gtt     PT denies fever, chills, nausea and vomiting       Abdomen: enlarged, gravid, soft, nontender     Results reviewed today:    No results found for this visit on 19. See prenatal vital sign section and fetal assessment section    ASSESSMENT & Plan    Diagnosis Orders   1. Lactation disorder     2. Encounter for supervision of other normal pregnancy in third trimester     3.

## 2019-04-05 ENCOUNTER — HOSPITAL ENCOUNTER (OUTPATIENT)
Dept: LAB | Age: 29
Discharge: HOME | End: 2019-04-05
Payer: COMMERCIAL

## 2019-04-05 DIAGNOSIS — O99.810 ABNORMAL GLUCOSE AFFECTING PREGNANCY: ICD-10-CM

## 2019-04-05 LAB
3 HR GLUCOSE: 98 MG/DL (ref 65–139)
AMOUNT GLUCOSE GIVEN: 100 G
GLUCOSE FASTING: 87 MG/DL (ref 65–94)
GLUCOSE TOLERANCE TEST 1 HOUR: 118 MG/DL (ref 65–179)
GLUCOSE TOLERANCE TEST 2 HOUR: 102 MG/DL (ref 65–154)

## 2019-04-05 PROCEDURE — 82951 GLUCOSE TOLERANCE TEST (GTT): CPT

## 2019-04-05 PROCEDURE — 82952 GTT-ADDED SAMPLES: CPT

## 2019-04-05 PROCEDURE — 36415 COLL VENOUS BLD VENIPUNCTURE: CPT

## 2019-04-23 ENCOUNTER — ROUTINE PRENATAL (OUTPATIENT)
Dept: OBGYN | Age: 29
End: 2019-04-23
Payer: COMMERCIAL

## 2019-04-23 VITALS — WEIGHT: 155 LBS | BODY MASS INDEX: 27.46 KG/M2 | SYSTOLIC BLOOD PRESSURE: 122 MMHG | DIASTOLIC BLOOD PRESSURE: 64 MMHG

## 2019-04-23 DIAGNOSIS — Z3A.30 30 WEEKS GESTATION OF PREGNANCY: Primary | ICD-10-CM

## 2019-04-23 DIAGNOSIS — Z34.83 ENCOUNTER FOR SUPERVISION OF OTHER NORMAL PREGNANCY IN THIRD TRIMESTER: ICD-10-CM

## 2019-04-23 LAB
ABDOMINAL CIRCUMFERENCE: 29.39 CM
BIPARIETAL DIAMETER: 8.03 CM
ESTIMATED FETAL WEIGHT: 2073 GRAMS
FEMORAL DIAMETER: NORMAL CM
HC/AC: 1
HEAD CIRCUMFERENCE: 29.35 CM

## 2019-04-23 PROCEDURE — 0502F SUBSEQUENT PRENATAL CARE: CPT | Performed by: ADVANCED PRACTICE MIDWIFE

## 2019-04-23 PROCEDURE — 76816 OB US FOLLOW-UP PER FETUS: CPT | Performed by: OBSTETRICS & GYNECOLOGY

## 2019-04-23 NOTE — PROGRESS NOTES
vomiting       Abdomen: enlarged, gravid, soft, nontender     Results reviewed today:    Results for orders placed or performed in visit on 04/23/19   US OB FOLLOW UP TRANSABDOMINAL APPROACH   Result Value Ref Range    Biparietal Diameter 8.03 cm    Abdominal Circumference 29.39 cm    Femoral Diameter  cm    Head Circumference 29.35 cm    HC/AC 1.00     Estimated Fetal Weight 2,073 grams       See prenatal vital sign section and fetal assessment section    ASSESSMENT & Plan    Diagnosis Orders   1. 30 weeks gestation of pregnancy  US OB FOLLOW UP TRANSABDOMINAL APPROACH   2. Encounter for supervision of other normal pregnancy in third trimester  US OB FOLLOW UP [address]             I am having Jeannie Lamas. Miller maintain her Lactobacillus (PROBIOTIC ACIDOPHILUS PO), Prenatal MV-Min-Fe Fum-FA-DHA (PRENATAL 1 PO), promethazine, ferrous sulfate, levalbuterol, Spacer/Aero-Holding Chambers, montelukast, and Breast Pump. Return in about 2 weeks (around 5/7/2019) for ob. There are no Patient Instructions on file for this visit.           Nabila Wayne,4/23/2019 9:51 AM

## 2019-05-06 ENCOUNTER — ROUTINE PRENATAL (OUTPATIENT)
Dept: OBGYN | Age: 29
End: 2019-05-06

## 2019-05-06 VITALS — BODY MASS INDEX: 27.78 KG/M2 | WEIGHT: 156.8 LBS | SYSTOLIC BLOOD PRESSURE: 118 MMHG | DIASTOLIC BLOOD PRESSURE: 66 MMHG

## 2019-05-06 DIAGNOSIS — Z34.83 ENCOUNTER FOR SUPERVISION OF OTHER NORMAL PREGNANCY IN THIRD TRIMESTER: Primary | ICD-10-CM

## 2019-05-06 DIAGNOSIS — Z3A.32 32 WEEKS GESTATION OF PREGNANCY: ICD-10-CM

## 2019-05-06 PROCEDURE — 0502F SUBSEQUENT PRENATAL CARE: CPT | Performed by: ADVANCED PRACTICE MIDWIFE

## 2019-05-06 NOTE — PROGRESS NOTES
Cristofer Perea is here at 32w4d for:    Chief Complaint   Patient presents with    Routine Prenatal Visit     Feeling well, unable to obtain urine. Estimated Due Date: Estimated Date of Delivery: 19    OB History    Para Term  AB Living   4 2 2   1 2   SAB TAB Ectopic Molar Multiple Live Births   1         2      # Outcome Date GA Lbr Robin/2nd Weight Sex Delivery Anes PTL Lv   4 Current            3 Term 14 39w5d 07:01 8 lb 6.1 oz (3.802 kg) F Vag-Spont EPI  VIOLETA   2 SAB  9w0d             Birth Comments: 13 week D&C   1 Term 11 41w0d  9 lb 4 oz (4.196 kg) M Vag-Spont EPI N VIOLETA        Past Medical History:   Diagnosis Date    Anemia during pregnancy in second trimester 2019    Breast disorder     Saima Salts infection     Exacerbation of rad (reactive airway disease), mild intermittent 3/1/2019       Past Surgical History:   Procedure Laterality Date    DILATION AND CURETTAGE      DILATION AND CURETTAGE OF UTERUS  13    missed        Social History     Tobacco Use   Smoking Status Never Smoker   Smokeless Tobacco Never Used        Social History     Substance and Sexual Activity   Alcohol Use No       No results found for this visit on 19. Vitals:  /66   Wt 156 lb 12.8 oz (71.1 kg)   LMP 2018 (Approximate)   BMI 27.78 kg/m²   Estimated body mass index is 27.78 kg/m² as calculated from the following:    Height as of 3/25/19: 5' 3\" (1.6 m). Weight as of this encounter: 156 lb 12.8 oz (71.1 kg). HPI: here for routine ob visit, denies c/o     PT denies fever, chills, nausea and vomiting       Abdomen: enlarged, gravid, soft, nontender     Results reviewed today:    No results found for this visit on 19. See prenatal vital sign section and fetal assessment section    ASSESSMENT & Plan    Diagnosis Orders   1.  Encounter for supervision of other normal pregnancy in third trimester     2. 32 weeks gestation of pregnancy               I am having Isabella Doll. Byron maintain her Lactobacillus (PROBIOTIC ACIDOPHILUS PO), Prenatal MV-Min-Fe Fum-FA-DHA (PRENATAL 1 PO), promethazine, ferrous sulfate, levalbuterol, Spacer/Aero-Holding Chambers, montelukast, and Breast Pump. Return in about 2 weeks (around 5/20/2019) for ob. There are no Patient Instructions on file for this visit.           Jackeline Wayne,5/6/2019 8:32 AM

## 2019-05-21 ENCOUNTER — ROUTINE PRENATAL (OUTPATIENT)
Dept: OBGYN | Age: 29
End: 2019-05-21

## 2019-05-21 VITALS — WEIGHT: 158 LBS | SYSTOLIC BLOOD PRESSURE: 122 MMHG | DIASTOLIC BLOOD PRESSURE: 62 MMHG | BODY MASS INDEX: 27.99 KG/M2

## 2019-05-21 DIAGNOSIS — Z34.83 ENCOUNTER FOR SUPERVISION OF OTHER NORMAL PREGNANCY IN THIRD TRIMESTER: Primary | ICD-10-CM

## 2019-05-21 DIAGNOSIS — Z3A.34 34 WEEKS GESTATION OF PREGNANCY: ICD-10-CM

## 2019-05-21 DIAGNOSIS — H60.503 ACUTE NONINFECTIVE OTITIS EXTERNA OF BOTH EARS, UNSPECIFIED TYPE: ICD-10-CM

## 2019-05-21 DIAGNOSIS — B00.9 HERPES INFECTION: ICD-10-CM

## 2019-05-21 PROCEDURE — 0502F SUBSEQUENT PRENATAL CARE: CPT | Performed by: ADVANCED PRACTICE MIDWIFE

## 2019-05-21 RX ORDER — ACYCLOVIR 50 MG/G
OINTMENT TOPICAL
Qty: 15 G | Refills: 1 | Status: SHIPPED | OUTPATIENT
Start: 2019-05-21 | End: 2019-05-28

## 2019-05-21 NOTE — PROGRESS NOTES
Luna Falk is here at 34w5d for:    Chief Complaint   Patient presents with    Routine Prenatal Visit     C/O itching in ears and cold sores. Off balance yesterday, allergies. ? Estimated Due Date: Estimated Date of Delivery: 19    OB History    Para Term  AB Living   4 2 2   1 2   SAB TAB Ectopic Molar Multiple Live Births   1         2      # Outcome Date GA Lbr Robin/2nd Weight Sex Delivery Anes PTL Lv   4 Current            3 Term 14 39w5d 07:01 8 lb 6.1 oz (3.802 kg) F Vag-Spont EPI  VIOLETA   2 SAB  9w0d             Birth Comments: 13 week D&C   1 Term 11 41w0d  9 lb 4 oz (4.196 kg) M Vag-Spont EPI N VIOLETA        Past Medical History:   Diagnosis Date    Anemia during pregnancy in second trimester 2019    Breast disorder     Janeth Bora infection     Exacerbation of rad (reactive airway disease), mild intermittent 3/1/2019       Past Surgical History:   Procedure Laterality Date    DILATION AND CURETTAGE      DILATION AND CURETTAGE OF UTERUS  13    missed        Social History     Tobacco Use   Smoking Status Never Smoker   Smokeless Tobacco Never Used        Social History     Substance and Sexual Activity   Alcohol Use No       No results found for this visit on 19. Vitals:  /62   Wt 158 lb (71.7 kg)   LMP 2018 (Approximate)   BMI 27.99 kg/m²   Estimated body mass index is 27.99 kg/m² as calculated from the following:    Height as of 3/25/19: 5' 3\" (1.6 m). Weight as of this encounter: 158 lb (71.7 kg). HPI: here for routine ob visit but c/o oral cold sores and ears \"itching\"     PT denies fever, chills, nausea and vomiting       Abdomen: enlarged, gravid,s oft, nontender   TMs ablated by cerumen; right ear canal has abraded area at 6 oclock  Results reviewed today:    No results found for this visit on 19.     See prenatal vital sign section and fetal assessment section    ASSESSMENT & Plan    Diagnosis Orders   1. Encounter for supervision of other normal pregnancy in third trimester     2. 34 weeks gestation of pregnancy     3. Herpes infection  acyclovir (ZOVIRAX) 5 % ointment   4. Acute noninfective otitis externa of both ears, unspecified type           no qtips, warm water irrigations    I am having Jeane Beebe start on acyclovir. I am also having her maintain her Lactobacillus (PROBIOTIC ACIDOPHILUS PO), Prenatal MV-Min-Fe Fum-FA-DHA (PRENATAL 1 PO), promethazine, ferrous sulfate, levalbuterol, Spacer/Aero-Holding Chambers, montelukast, and Breast Pump. Return in about 1 week (around 5/28/2019) for obgbs. There are no Patient Instructions on file for this visit.           Ariane Wayne,5/22/2019 10:03 AM

## 2019-05-28 ENCOUNTER — HOSPITAL ENCOUNTER (OUTPATIENT)
Age: 29
Setting detail: SPECIMEN
Discharge: HOME | End: 2019-05-28
Payer: COMMERCIAL

## 2019-05-28 ENCOUNTER — ROUTINE PRENATAL (OUTPATIENT)
Dept: OBGYN | Age: 29
End: 2019-05-28

## 2019-05-28 VITALS — WEIGHT: 160.4 LBS | BODY MASS INDEX: 28.41 KG/M2 | SYSTOLIC BLOOD PRESSURE: 118 MMHG | DIASTOLIC BLOOD PRESSURE: 72 MMHG

## 2019-05-28 DIAGNOSIS — Z3A.35 35 WEEKS GESTATION OF PREGNANCY: ICD-10-CM

## 2019-05-28 DIAGNOSIS — Z34.83 ENCOUNTER FOR SUPERVISION OF OTHER NORMAL PREGNANCY IN THIRD TRIMESTER: Primary | ICD-10-CM

## 2019-05-28 PROCEDURE — 87081 CULTURE SCREEN ONLY: CPT

## 2019-05-28 PROCEDURE — 0502F SUBSEQUENT PRENATAL CARE: CPT | Performed by: ADVANCED PRACTICE MIDWIFE

## 2019-05-28 NOTE — PROGRESS NOTES
Lizzeth Gonzalez is here at 35w5d for:    Chief Complaint   Patient presents with    Routine Prenatal Visit     GBS today. Estimated Due Date: Estimated Date of Delivery: 19    OB History    Para Term  AB Living   4 2 2   1 2   SAB TAB Ectopic Molar Multiple Live Births   1         2      # Outcome Date GA Lbr Robin/2nd Weight Sex Delivery Anes PTL Lv   4 Current            3 Term 14 39w5d 07:01 8 lb 6.1 oz (3.802 kg) F Vag-Spont EPI  VIOLETA   2 SAB  9w0d             Birth Comments: 13 week D&C   1 Term 11 41w0d  9 lb 4 oz (4.196 kg) M Vag-Spont EPI N VIOLETA        Past Medical History:   Diagnosis Date    Anemia during pregnancy in second trimester 2019    Breast disorder     Sallyann Lei infection     Exacerbation of rad (reactive airway disease), mild intermittent 3/1/2019       Past Surgical History:   Procedure Laterality Date    DILATION AND CURETTAGE      DILATION AND CURETTAGE OF UTERUS  13    missed        Social History     Tobacco Use   Smoking Status Never Smoker   Smokeless Tobacco Never Used        Social History     Substance and Sexual Activity   Alcohol Use No       No results found for this visit on 19. Vitals:  /72   Wt 160 lb 6.4 oz (72.8 kg)   LMP 2018 (Approximate)   BMI 28.41 kg/m²   Estimated body mass index is 28.41 kg/m² as calculated from the following:    Height as of 3/25/19: 5' 3\" (1.6 m). Weight as of this encounter: 160 lb 6.4 oz (72.8 kg). HPI: here for routine ob visit and gbs     PT denies fever, chills, nausea and vomiting       Abdomen: enlarged, gravid, soft, nontender     Results reviewed today:    No results found for this visit on 19. See prenatal vital sign section and fetal assessment section    ASSESSMENT & Plan    Diagnosis Orders   1.  Encounter for supervision of other normal pregnancy in third trimester     2. 35 weeks gestation of pregnancy  Strep B Screen, Vaginal / Rectal             I am having Jeane HOGANSánchez Beebe maintain her Lactobacillus (PROBIOTIC ACIDOPHILUS PO), Prenatal MV-Min-Fe Fum-FA-DHA (PRENATAL 1 PO), promethazine, ferrous sulfate, levalbuterol, Spacer/Aero-Holding Chambers, montelukast, Breast Pump, and acyclovir. Return in about 1 week (around 6/4/2019) for ob. There are no Patient Instructions on file for this visit.           Rex Wayne,5/28/2019 12:20 PM

## 2019-05-30 ENCOUNTER — TELEPHONE (OUTPATIENT)
Dept: OBGYN | Age: 29
End: 2019-05-30

## 2019-05-30 NOTE — TELEPHONE ENCOUNTER
Patient called with concerns of swelling of lips and hives on face. Patient had cold sore? Several weeks ago so stopped taking PNV and iron. Has restarted those medications and now has symptoms again. Stopped PNV and iron again and has started Benadryl. Any other advice?

## 2019-05-30 NOTE — TELEPHONE ENCOUNTER
Patient advised and feels she is improving with Benadryl. Will proceed to ED if worse or call with any further concerns.

## 2019-06-01 LAB
CULTURE: NORMAL
Lab: NORMAL
SPECIMEN DESCRIPTION: NORMAL

## 2019-06-03 ENCOUNTER — ROUTINE PRENATAL (OUTPATIENT)
Dept: OBGYN | Age: 29
End: 2019-06-03
Payer: COMMERCIAL

## 2019-06-03 ENCOUNTER — HOSPITAL ENCOUNTER (OUTPATIENT)
Age: 29
Setting detail: SPECIMEN
Discharge: HOME | End: 2019-06-03
Payer: COMMERCIAL

## 2019-06-03 VITALS — SYSTOLIC BLOOD PRESSURE: 122 MMHG | BODY MASS INDEX: 28.52 KG/M2 | WEIGHT: 161 LBS | DIASTOLIC BLOOD PRESSURE: 80 MMHG

## 2019-06-03 DIAGNOSIS — Z34.83 ENCOUNTER FOR SUPERVISION OF OTHER NORMAL PREGNANCY IN THIRD TRIMESTER: Primary | ICD-10-CM

## 2019-06-03 DIAGNOSIS — Z3A.36 36 WEEKS GESTATION OF PREGNANCY: ICD-10-CM

## 2019-06-03 DIAGNOSIS — R21 RASH: ICD-10-CM

## 2019-06-03 LAB
ALBUMIN SERPL-MCNC: 3.6 G/DL (ref 3.5–5.2)
ALBUMIN/GLOBULIN RATIO: 1.1 (ref 1–2.5)
ALP BLD-CCNC: 96 U/L (ref 35–104)
ALT SERPL-CCNC: 7 U/L (ref 5–33)
ANION GAP SERPL CALCULATED.3IONS-SCNC: 15 MMOL/L (ref 9–17)
AST SERPL-CCNC: 15 U/L
BILIRUB SERPL-MCNC: 0.59 MG/DL (ref 0.3–1.2)
BUN BLDV-MCNC: 7 MG/DL (ref 6–20)
BUN/CREAT BLD: 14 (ref 9–20)
CALCIUM SERPL-MCNC: 9.3 MG/DL (ref 8.6–10.4)
CHLORIDE BLD-SCNC: 99 MMOL/L (ref 98–107)
CO2: 22 MMOL/L (ref 20–31)
CREAT SERPL-MCNC: 0.5 MG/DL (ref 0.5–0.9)
GFR AFRICAN AMERICAN: >60 ML/MIN
GFR NON-AFRICAN AMERICAN: >60 ML/MIN
GFR SERPL CREATININE-BSD FRML MDRD: ABNORMAL ML/MIN/{1.73_M2}
GFR SERPL CREATININE-BSD FRML MDRD: ABNORMAL ML/MIN/{1.73_M2}
GLUCOSE BLD-MCNC: 104 MG/DL (ref 70–99)
POTASSIUM SERPL-SCNC: 4 MMOL/L (ref 3.7–5.3)
SODIUM BLD-SCNC: 136 MMOL/L (ref 135–144)
TOTAL PROTEIN: 6.8 G/DL (ref 6.4–8.3)

## 2019-06-03 PROCEDURE — 0502F SUBSEQUENT PRENATAL CARE: CPT | Performed by: ADVANCED PRACTICE MIDWIFE

## 2019-06-03 PROCEDURE — 80053 COMPREHEN METABOLIC PANEL: CPT

## 2019-06-03 PROCEDURE — 36415 COLL VENOUS BLD VENIPUNCTURE: CPT | Performed by: ADVANCED PRACTICE MIDWIFE

## 2019-06-03 PROCEDURE — 82239 BILE ACIDS TOTAL: CPT

## 2019-06-03 NOTE — PROGRESS NOTES
Maureen Johnson is here at 36w4d for:    Chief Complaint   Patient presents with    Routine Prenatal Visit     C/O hives on face and hands, itchy. Patient stopped PNV and iron and has been taking Benadryl. Estimated Due Date: Estimated Date of Delivery: 19    OB History    Para Term  AB Living   4 2 2   1 2   SAB TAB Ectopic Molar Multiple Live Births   1         2      # Outcome Date GA Lbr Robin/2nd Weight Sex Delivery Anes PTL Lv   4 Current            3 Term 14 39w5d 07:01 8 lb 6.1 oz (3.802 kg) F Vag-Spont EPI  VIOLETA   2 SAB  9w0d             Birth Comments: 13 week D&C   1 Term 11 41w0d  9 lb 4 oz (4.196 kg) M Vag-Spont EPI N VIOLETA        Past Medical History:   Diagnosis Date    Anemia during pregnancy in second trimester 2019    Breast disorder     Edwinna Juan infection     Exacerbation of rad (reactive airway disease), mild intermittent 3/1/2019       Past Surgical History:   Procedure Laterality Date    DILATION AND CURETTAGE      DILATION AND CURETTAGE OF UTERUS  13    missed        Social History     Tobacco Use   Smoking Status Never Smoker   Smokeless Tobacco Never Used        Social History     Substance and Sexual Activity   Alcohol Use No       No results found for this visit on 19. Vitals:  /80   Wt 161 lb (73 kg)   LMP 2018 (Approximate)   BMI 28.52 kg/m²   Estimated body mass index is 28.52 kg/m² as calculated from the following:    Height as of 3/25/19: 5' 3\" (1.6 m). Weight as of this encounter: 161 lb (73 kg). HPI: here for routine ob visit, c/o face and hand rash and pruritis, doesn't know of new environmental exposures and no one around her has this rash     PT denies fever, chills, nausea and vomiting       Abdomen: enlarged, gravid, soft, nontender     Results reviewed today:    No results found for this visit on 19.     See prenatal vital sign section and fetal assessment

## 2019-06-05 LAB — BILE ACIDS TOTAL: 4 UMOL/L (ref 0–10)

## 2019-06-10 ENCOUNTER — ROUTINE PRENATAL (OUTPATIENT)
Dept: OBGYN | Age: 29
End: 2019-06-10

## 2019-06-10 VITALS — WEIGHT: 161.8 LBS | SYSTOLIC BLOOD PRESSURE: 122 MMHG | BODY MASS INDEX: 28.66 KG/M2 | DIASTOLIC BLOOD PRESSURE: 78 MMHG

## 2019-06-10 DIAGNOSIS — Z34.83 ENCOUNTER FOR SUPERVISION OF OTHER NORMAL PREGNANCY IN THIRD TRIMESTER: Primary | ICD-10-CM

## 2019-06-10 DIAGNOSIS — Z3A.37 37 WEEKS GESTATION OF PREGNANCY: ICD-10-CM

## 2019-06-10 PROCEDURE — 0502F SUBSEQUENT PRENATAL CARE: CPT | Performed by: ADVANCED PRACTICE MIDWIFE

## 2019-06-10 NOTE — PROGRESS NOTES
Kathi Bosworth is here at 37w4d for:    Chief Complaint   Patient presents with    Routine Prenatal Visit     feeling good. Estimated Due Date: Estimated Date of Delivery: 19    OB History    Para Term  AB Living   4 2 2   1 2   SAB TAB Ectopic Molar Multiple Live Births   1         2      # Outcome Date GA Lbr Robin/2nd Weight Sex Delivery Anes PTL Lv   4 Current            3 Term 14 39w5d 07:01 8 lb 6.1 oz (3.802 kg) F Vag-Spont EPI  VIOLETA   2 SAB  9w0d             Birth Comments: 13 week D&C   1 Term 11 41w0d  9 lb 4 oz (4.196 kg) M Vag-Spont EPI N VIOLETA        Past Medical History:   Diagnosis Date    Anemia during pregnancy in second trimester 2019    Breast disorder     Arleene Pimple infection     Exacerbation of rad (reactive airway disease), mild intermittent 3/1/2019       Past Surgical History:   Procedure Laterality Date    DILATION AND CURETTAGE      DILATION AND CURETTAGE OF UTERUS  13    missed        Social History     Tobacco Use   Smoking Status Never Smoker   Smokeless Tobacco Never Used        Social History     Substance and Sexual Activity   Alcohol Use No       No results found for this visit on 06/10/19. Vitals:  /78   Wt 161 lb 12.8 oz (73.4 kg)   LMP 2018 (Approximate)   BMI 28.66 kg/m²   Estimated body mass index is 28.66 kg/m² as calculated from the following:    Height as of 3/25/19: 5' 3\" (1.6 m). Weight as of this encounter: 161 lb 12.8 oz (73.4 kg). HPI: here for routine ob visit, denies c/o     PT denies fever, chills, nausea and vomiting       Abdomen: enlarged, gravid, soft, nontender     Results reviewed today:    No results found for this visit on 06/10/19. See prenatal vital sign section and fetal assessment section    ASSESSMENT & Plan    Diagnosis Orders   1.  Encounter for supervision of other normal pregnancy in third trimester     2. 37 weeks gestation of pregnancy               I am having Viviane Sotelo. Brewster maintain her Lactobacillus (PROBIOTIC ACIDOPHILUS PO), Prenatal MV-Min-Fe Fum-FA-DHA (PRENATAL 1 PO), promethazine, ferrous sulfate, levalbuterol, Spacer/Aero-Holding Chambers, montelukast, and Breast Pump. Return in about 1 week (around 6/17/2019) for ob. There are no Patient Instructions on file for this visit.           Nola Wayne,6/10/2019 11:47 AM

## 2019-06-17 ENCOUNTER — ROUTINE PRENATAL (OUTPATIENT)
Dept: OBGYN | Age: 29
End: 2019-06-17

## 2019-06-17 VITALS — SYSTOLIC BLOOD PRESSURE: 118 MMHG | WEIGHT: 162.6 LBS | DIASTOLIC BLOOD PRESSURE: 72 MMHG | BODY MASS INDEX: 28.8 KG/M2

## 2019-06-17 DIAGNOSIS — Z3A.38 38 WEEKS GESTATION OF PREGNANCY: ICD-10-CM

## 2019-06-17 DIAGNOSIS — Z34.83 ENCOUNTER FOR SUPERVISION OF OTHER NORMAL PREGNANCY IN THIRD TRIMESTER: Primary | ICD-10-CM

## 2019-06-17 PROCEDURE — 0502F SUBSEQUENT PRENATAL CARE: CPT | Performed by: ADVANCED PRACTICE MIDWIFE

## 2019-06-17 NOTE — PROGRESS NOTES
Kathi Bosworth is here at 38w4d for:    Chief Complaint   Patient presents with    Routine Prenatal Visit     Feeling good. Estimated Due Date: Estimated Date of Delivery: 19    OB History    Para Term  AB Living   4 2 2   1 2   SAB TAB Ectopic Molar Multiple Live Births   1         2      # Outcome Date GA Lbr Robin/2nd Weight Sex Delivery Anes PTL Lv   4 Current            3 Term 14 39w5d 07:01 8 lb 6.1 oz (3.802 kg) F Vag-Spont EPI  VIOLETA   2 SAB  9w0d             Birth Comments: 13 week D&C   1 Term 11 41w0d  9 lb 4 oz (4.196 kg) M Vag-Spont EPI N VIOLETA        Past Medical History:   Diagnosis Date    Anemia during pregnancy in second trimester 2019    Breast disorder     Arleene Pimple infection     Exacerbation of rad (reactive airway disease), mild intermittent 3/1/2019       Past Surgical History:   Procedure Laterality Date    DILATION AND CURETTAGE      DILATION AND CURETTAGE OF UTERUS  13    missed        Social History     Tobacco Use   Smoking Status Never Smoker   Smokeless Tobacco Never Used        Social History     Substance and Sexual Activity   Alcohol Use No       No results found for this visit on 19. Vitals:  /72   Wt 162 lb 9.6 oz (73.8 kg)   LMP 2018 (Approximate)   BMI 28.80 kg/m²   Estimated body mass index is 28.8 kg/m² as calculated from the following:    Height as of 3/25/19: 5' 3\" (1.6 m). Weight as of this encounter: 162 lb 9.6 oz (73.8 kg). HPI: here for routine ob visit, denies c/o     PT denies fever, chills, nausea and vomiting       Abdomen: enlarged, gravid, soft, nontender     Results reviewed today:    No results found for this visit on 19. See prenatal vital sign section and fetal assessment section    ASSESSMENT & Plan    Diagnosis Orders   1.  Encounter for supervision of other normal pregnancy in third trimester     2. 38 weeks gestation of pregnancy               I am having Barbra Ayala Beebe maintain her Lactobacillus (PROBIOTIC ACIDOPHILUS PO), Prenatal MV-Min-Fe Fum-FA-DHA (PRENATAL 1 PO), promethazine, ferrous sulfate, levalbuterol, Spacer/Aero-Holding Chambers, montelukast, and Breast Pump. Return in about 1 week (around 6/24/2019) for ob. There are no Patient Instructions on file for this visit.           Karmen Wayne,6/17/2019 9:12 AM

## 2019-06-19 ENCOUNTER — HOSPITAL ENCOUNTER (INPATIENT)
Age: 29
LOS: 3 days | Discharge: HOME | End: 2019-06-22
Attending: ADVANCED PRACTICE MIDWIFE | Admitting: ADVANCED PRACTICE MIDWIFE
Payer: COMMERCIAL

## 2019-06-19 LAB
ABSOLUTE EOS #: 0.09 K/UL (ref 0–0.44)
ABSOLUTE IMMATURE GRANULOCYTE: 0.05 K/UL (ref 0–0.3)
ABSOLUTE LYMPH #: 2.55 K/UL (ref 1.1–3.7)
ABSOLUTE MONO #: 0.7 K/UL (ref 0.1–1.2)
AMPHETAMINE SCREEN URINE: NEGATIVE
BARBITURATE SCREEN URINE: NEGATIVE
BASOPHILS # BLD: 0 % (ref 0–2)
BASOPHILS ABSOLUTE: 0.03 K/UL (ref 0–0.2)
BENZODIAZEPINE SCREEN, URINE: NEGATIVE
BUPRENORPHINE URINE: NEGATIVE
CANNABINOID SCREEN URINE: NEGATIVE
COCAINE METABOLITE, URINE: NEGATIVE
DIFFERENTIAL TYPE: ABNORMAL
EOSINOPHILS RELATIVE PERCENT: 1 % (ref 1–4)
HCT VFR BLD CALC: 34.4 % (ref 36.3–47.1)
HEMOGLOBIN: 11.2 G/DL (ref 11.9–15.1)
IMMATURE GRANULOCYTES: 1 %
LYMPHOCYTES # BLD: 28 % (ref 24–43)
MCH RBC QN AUTO: 30.9 PG (ref 25.2–33.5)
MCHC RBC AUTO-ENTMCNC: 32.6 G/DL (ref 28.4–34.8)
MCV RBC AUTO: 94.8 FL (ref 82.6–102.9)
MDMA URINE: NORMAL
METHADONE SCREEN, URINE: NEGATIVE
METHAMPHETAMINE, URINE: NEGATIVE
MONOCYTES # BLD: 8 % (ref 3–12)
NRBC AUTOMATED: 0 PER 100 WBC
OPIATES, URINE: NEGATIVE
OXYCODONE SCREEN URINE: NEGATIVE
PDW BLD-RTO: 12.9 % (ref 11.8–14.4)
PHENCYCLIDINE, URINE: NEGATIVE
PLATELET # BLD: 167 K/UL (ref 138–453)
PLATELET ESTIMATE: ABNORMAL
PMV BLD AUTO: 12.4 FL (ref 8.1–13.5)
PROPOXYPHENE, URINE: NEGATIVE
RBC # BLD: 3.63 M/UL (ref 3.95–5.11)
RBC # BLD: ABNORMAL 10*6/UL
SEG NEUTROPHILS: 62 % (ref 36–65)
SEGMENTED NEUTROPHILS ABSOLUTE COUNT: 5.79 K/UL (ref 1.5–8.1)
TEST INFORMATION: NORMAL
TRICYCLIC ANTIDEPRESSANTS, UR: NEGATIVE
WBC # BLD: 9.2 K/UL (ref 3.5–11.3)
WBC # BLD: ABNORMAL 10*3/UL

## 2019-06-19 PROCEDURE — 1220000000 HC SEMI PRIVATE OB R&B

## 2019-06-19 PROCEDURE — 36415 COLL VENOUS BLD VENIPUNCTURE: CPT

## 2019-06-19 PROCEDURE — 85025 COMPLETE CBC W/AUTO DIFF WBC: CPT

## 2019-06-19 PROCEDURE — 80306 DRUG TEST PRSMV INSTRMNT: CPT

## 2019-06-19 PROCEDURE — 99024 POSTOP FOLLOW-UP VISIT: CPT | Performed by: ADVANCED PRACTICE MIDWIFE

## 2019-06-19 RX ORDER — METHYLERGONOVINE MALEATE 0.2 MG/ML
200 INJECTION INTRAVENOUS PRN
Status: DISCONTINUED | OUTPATIENT
Start: 2019-06-19 | End: 2019-06-22 | Stop reason: HOSPADM

## 2019-06-19 RX ORDER — MISOPROSTOL 100 UG/1
900 TABLET ORAL PRN
Status: DISCONTINUED | OUTPATIENT
Start: 2019-06-19 | End: 2019-06-22 | Stop reason: HOSPADM

## 2019-06-19 RX ORDER — CARBOPROST TROMETHAMINE 250 UG/ML
250 INJECTION, SOLUTION INTRAMUSCULAR PRN
Status: DISCONTINUED | OUTPATIENT
Start: 2019-06-19 | End: 2019-06-22 | Stop reason: HOSPADM

## 2019-06-19 RX ORDER — LIDOCAINE HYDROCHLORIDE 10 MG/ML
30 INJECTION, SOLUTION EPIDURAL; INFILTRATION; INTRACAUDAL; PERINEURAL PRN
Status: DISCONTINUED | OUTPATIENT
Start: 2019-06-19 | End: 2019-06-22 | Stop reason: HOSPADM

## 2019-06-19 RX ORDER — NALBUPHINE HCL 10 MG/ML
10 AMPUL (ML) INJECTION
Status: DISCONTINUED | OUTPATIENT
Start: 2019-06-19 | End: 2019-06-22 | Stop reason: HOSPADM

## 2019-06-19 RX ORDER — ACETAMINOPHEN 325 MG/1
650 TABLET ORAL EVERY 4 HOURS PRN
Status: DISCONTINUED | OUTPATIENT
Start: 2019-06-19 | End: 2019-06-22 | Stop reason: HOSPADM

## 2019-06-19 RX ORDER — SODIUM CHLORIDE 0.9 % (FLUSH) 0.9 %
10 SYRINGE (ML) INJECTION PRN
Status: DISCONTINUED | OUTPATIENT
Start: 2019-06-19 | End: 2019-06-22 | Stop reason: HOSPADM

## 2019-06-19 RX ORDER — SEVOFLURANE 250 ML/250ML
1 LIQUID RESPIRATORY (INHALATION) CONTINUOUS PRN
Status: DISCONTINUED | OUTPATIENT
Start: 2019-06-19 | End: 2019-06-22 | Stop reason: HOSPADM

## 2019-06-19 RX ORDER — SODIUM CHLORIDE 0.9 % (FLUSH) 0.9 %
10 SYRINGE (ML) INJECTION EVERY 12 HOURS SCHEDULED
Status: DISCONTINUED | OUTPATIENT
Start: 2019-06-19 | End: 2019-06-22 | Stop reason: HOSPADM

## 2019-06-19 RX ORDER — ONDANSETRON 2 MG/ML
4 INJECTION INTRAMUSCULAR; INTRAVENOUS EVERY 6 HOURS PRN
Status: DISCONTINUED | OUTPATIENT
Start: 2019-06-19 | End: 2019-06-22 | Stop reason: HOSPADM

## 2019-06-19 RX ORDER — SODIUM CHLORIDE, SODIUM LACTATE, POTASSIUM CHLORIDE, CALCIUM CHLORIDE 600; 310; 30; 20 MG/100ML; MG/100ML; MG/100ML; MG/100ML
INJECTION, SOLUTION INTRAVENOUS PRN
Status: DISCONTINUED | OUTPATIENT
Start: 2019-06-19 | End: 2019-06-22 | Stop reason: HOSPADM

## 2019-06-20 PROCEDURE — 6370000000 HC RX 637 (ALT 250 FOR IP): Performed by: ADVANCED PRACTICE MIDWIFE

## 2019-06-20 PROCEDURE — 1220000000 HC SEMI PRIVATE OB R&B

## 2019-06-20 PROCEDURE — 59400 OBSTETRICAL CARE: CPT | Performed by: ADVANCED PRACTICE MIDWIFE

## 2019-06-20 PROCEDURE — 6360000002 HC RX W HCPCS: Performed by: ADVANCED PRACTICE MIDWIFE

## 2019-06-20 PROCEDURE — 2580000003 HC RX 258: Performed by: ADVANCED PRACTICE MIDWIFE

## 2019-06-20 PROCEDURE — 7200000001 HC VAGINAL DELIVERY

## 2019-06-20 RX ORDER — ACETAMINOPHEN 325 MG/1
650 TABLET ORAL EVERY 4 HOURS PRN
Status: DISCONTINUED | OUTPATIENT
Start: 2019-06-20 | End: 2019-06-22 | Stop reason: HOSPADM

## 2019-06-20 RX ORDER — METHYLERGONOVINE MALEATE 0.2 MG/ML
200 INJECTION INTRAVENOUS
Status: ACTIVE | OUTPATIENT
Start: 2019-06-20 | End: 2019-06-20

## 2019-06-20 RX ORDER — DOCUSATE SODIUM 100 MG/1
100 CAPSULE, LIQUID FILLED ORAL 2 TIMES DAILY PRN
Status: DISCONTINUED | OUTPATIENT
Start: 2019-06-20 | End: 2019-06-22 | Stop reason: HOSPADM

## 2019-06-20 RX ORDER — LANOLIN 100 %
OINTMENT (GRAM) TOPICAL PRN
Status: DISCONTINUED | OUTPATIENT
Start: 2019-06-20 | End: 2019-06-22 | Stop reason: HOSPADM

## 2019-06-20 RX ORDER — SODIUM CHLORIDE 0.9 % (FLUSH) 0.9 %
10 SYRINGE (ML) INJECTION PRN
Status: DISCONTINUED | OUTPATIENT
Start: 2019-06-20 | End: 2019-06-22 | Stop reason: HOSPADM

## 2019-06-20 RX ORDER — SODIUM CHLORIDE 0.9 % (FLUSH) 0.9 %
10 SYRINGE (ML) INJECTION EVERY 12 HOURS SCHEDULED
Status: DISCONTINUED | OUTPATIENT
Start: 2019-06-20 | End: 2019-06-22 | Stop reason: HOSPADM

## 2019-06-20 RX ORDER — IBUPROFEN 800 MG/1
800 TABLET ORAL EVERY 8 HOURS
Status: DISCONTINUED | OUTPATIENT
Start: 2019-06-20 | End: 2019-06-22 | Stop reason: HOSPADM

## 2019-06-20 RX ADMIN — DOCUSATE SODIUM 100 MG: 100 CAPSULE, LIQUID FILLED ORAL at 08:06

## 2019-06-20 RX ADMIN — NALBUPHINE HYDROCHLORIDE 10 MG: 10 INJECTION, SOLUTION INTRAMUSCULAR; INTRAVENOUS; SUBCUTANEOUS at 00:06

## 2019-06-20 RX ADMIN — IBUPROFEN 800 MG: 800 TABLET, FILM COATED ORAL at 19:52

## 2019-06-20 RX ADMIN — ACETAMINOPHEN 650 MG: 325 TABLET, FILM COATED ORAL at 14:47

## 2019-06-20 RX ADMIN — IBUPROFEN 800 MG: 800 TABLET, FILM COATED ORAL at 10:17

## 2019-06-20 RX ADMIN — Medication 10 ML: at 19:54

## 2019-06-20 RX ADMIN — BENZOCAINE AND LEVOMENTHOL: 200; 5 SPRAY TOPICAL at 14:50

## 2019-06-20 RX ADMIN — NALBUPHINE HYDROCHLORIDE 10 MG: 10 INJECTION, SOLUTION INTRAMUSCULAR; INTRAVENOUS; SUBCUTANEOUS at 06:13

## 2019-06-20 RX ADMIN — SODIUM CHLORIDE, POTASSIUM CHLORIDE, SODIUM LACTATE AND CALCIUM CHLORIDE 150 ML/HR: 600; 310; 30; 20 INJECTION, SOLUTION INTRAVENOUS at 04:25

## 2019-06-20 RX ADMIN — OXYTOCIN-SODIUM CHLORIDE 0.9% IV SOLN 30 UNIT/500ML 1 MILLI-UNITS/MIN: 30-0.9/5 SOLUTION at 04:25

## 2019-06-20 RX ADMIN — ONDANSETRON 4 MG: 2 INJECTION INTRAMUSCULAR; INTRAVENOUS at 04:59

## 2019-06-20 RX ADMIN — METHYLERGONOVINE MALEATE 200 MCG: 0.2 INJECTION INTRAVENOUS at 06:21

## 2019-06-20 ASSESSMENT — PAIN SCALES - GENERAL
PAINLEVEL_OUTOF10: 10
PAINLEVEL_OUTOF10: 8
PAINLEVEL_OUTOF10: 4

## 2019-06-20 NOTE — PLAN OF CARE
Problem: Anxiety:  Goal: Level of anxiety will decrease  Description  Level of anxiety will decrease  6/20/2019 0908 by Unruly Sandoval RN  Outcome: Ongoing  6/20/2019 0906 by Unruly Sandoval RN  Outcome: Ongoing  6/19/2019 2225 by Naheed Palma RN  Outcome: Ongoing     Problem: Fluid Volume - Imbalance:  Goal: Absence of postpartum hemorrhage signs and symptoms  Description  Absence of postpartum hemorrhage signs and symptoms  Outcome: Ongoing     Problem: Pain - Acute:  Goal: Pain level will decrease  Description  Pain level will decrease  6/20/2019 0908 by Unruly Sandoval RN  Outcome: Ongoing  6/20/2019 0906 by Unruly Sandoval RN  Outcome: Ongoing  6/19/2019 2225 by Naheed Palma RN  Outcome: Ongoing  Goal: Able to cope with pain  Description  Able to cope with pain  6/20/2019 0908 by Unruly Sandoval RN  Outcome: Ongoing  6/19/2019 2225 by Naheed Palma RN  Outcome: Ongoing     Problem: Urinary Retention:  Goal: Experiences of bladder distention will decrease  Description  Experiences of bladder distention will decrease  6/20/2019 0908 by Unruly Sandoval RN  Outcome: Ongoing  6/20/2019 0906 by Unruly Sandoval RN  Outcome: Ongoing  6/19/2019 2225 by Naheed Palma RN  Outcome: Ongoing  Goal: Urinary elimination within specified parameters  Description  Urinary elimination within specified parameters  6/20/2019 0908 by Unruly Sandoval RN  Outcome: Ongoing  6/20/2019 0906 by Unruly Sandoval RN  Outcome: Ongoing  6/19/2019 2225 by Naheed Palma RN  Outcome: Ongoing     Problem: Pain:  Description  Pain management should include both nonpharmacologic and pharmacologic interventions.   Goal: Pain level will decrease  Description  Pain level will decrease  6/20/2019 0908 by Unruly Sandoval RN  Outcome: Ongoing  6/20/2019 0906 by Unruly Sandoval RN  Outcome: Ongoing  6/19/2019 2225 by Naheed Palma RN  Outcome: Ongoing  Goal: Control of acute pain  Description  Control of acute pain  6/20/2019 0908 by Nury Mcmahon RN  Outcome: Ongoing  6/20/2019 0906 by Nury Mcmahon RN  Outcome: Ongoing  Goal: Control of chronic pain  Description  Control of chronic pain  6/20/2019 0908 by Nury Mcmahon RN  Outcome: Ongoing  6/20/2019 0906 by Nury Mcmahon RN  Outcome: Ongoing     Problem: Discharge Planning:  Goal: Discharged to appropriate level of care  Description  Discharged to appropriate level of care  Outcome: Ongoing     Problem: Constipation:  Goal: Bowel elimination is within specified parameters  Description  Bowel elimination is within specified parameters  Outcome: Ongoing     Problem: Mood - Altered:  Goal: Mood stable  Description  Mood stable  Outcome: Ongoing

## 2019-06-20 NOTE — PLAN OF CARE
Problem: Anxiety:  Goal: Level of anxiety will decrease  Description  Level of anxiety will decrease  Outcome: Ongoing     Problem: Breathing Pattern - Ineffective:  Goal: Able to breathe comfortably  Description  Able to breathe comfortably  Outcome: Ongoing     Problem: Fluid Volume - Imbalance:  Goal: Absence of imbalanced fluid volume signs and symptoms  Description  Absence of imbalanced fluid volume signs and symptoms  Outcome: Ongoing  Goal: Absence of intrapartum hemorrhage signs and symptoms  Description  Absence of intrapartum hemorrhage signs and symptoms  Outcome: Ongoing     Problem: Infection - Intrapartum Infection:  Goal: Will show no infection signs and symptoms  Description  Will show no infection signs and symptoms  Outcome: Ongoing     Problem: Labor Process - Prolonged:  Goal: Labor progression, first stage, within specified pattern  Description  Labor progression, first stage, within specified pattern  Outcome: Ongoing  Goal: Labor progession, second stage, within specified pattern  Description  Labor progession, second stage, within specified pattern  Outcome: Ongoing  Goal: Uterine contractions within specified parameters  Description  Uterine contractions within specified parameters  Outcome: Ongoing     Problem:  Screening:  Goal: Ability to make informed decisions regarding treatment has improved  Description  Ability to make informed decisions regarding treatment has improved  Outcome: Ongoing     Problem: Pain - Acute:  Goal: Pain level will decrease  Description  Pain level will decrease  Outcome: Ongoing  Goal: Able to cope with pain  Description  Able to cope with pain  Outcome: Ongoing     Problem: Tissue Perfusion - Uteroplacental, Altered:  Description  [TRUNCATED] For intrapartum patients with recurrent variable decelerations of the fetal heart rate, consider transcervical amnioinfusion. For patients in labor, avoid prophylactic use of continuous maternal oxygen

## 2019-06-20 NOTE — FLOWSHEET NOTE
Reynold Choe CNM aware of patient arrival and grossly ruptured/ SVE. Okay for normal labor orders.  Provider will head to unit

## 2019-06-20 NOTE — H&P
Department of Obstetrics and Gynecology   Obstetrics History and Physical  Jacquiline Brothers. Addieyl Scales  H&P Admission Inpatient  Note        CHIEF COMPLAINT:  Here with c/o SROM at 2145 and ctxs immediately starting, cervical change on admit    HISTORY OF PRESENT ILLNESS:      The patient is a 30 y.o. female at 38w7d. OB History        4    Para   2    Term   2            AB   1    Living   2       SAB   1    TAB        Ectopic        Molar        Multiple        Live Births   2            Patient presents with a chief complaint as above and is being admitted for active phase labor    Estimated Due Date: Estimated Date of Delivery: 19    PRENATAL CARE:    Complicated by: had influenza during pregnancy    PAST OB HISTORY:  OB History        4    Para   2    Term   2            AB   1    Living   2       SAB   1    TAB        Ectopic        Molar        Multiple        Live Births   2                Past Medical History:        Diagnosis Date    Anemia during pregnancy in second trimester 2019    Breast disorder     Otilia Constanza infection     Exacerbation of rad (reactive airway disease), mild intermittent 3/1/2019     Past Surgical History:        Procedure Laterality Date    DILATION AND CURETTAGE      DILATION AND CURETTAGE OF UTERUS  13    missed      Allergies:  Patient has no known allergies.     Social History:    Social History     Socioeconomic History    Marital status:      Spouse name: Not on file    Number of children: Not on file    Years of education: Not on file    Highest education level: Not on file   Occupational History    Not on file   Social Needs    Financial resource strain: Not on file    Food insecurity:     Worry: Not on file     Inability: Not on file    Transportation needs:     Medical: Not on file     Non-medical: Not on file   Tobacco Use    Smoking status: Never Smoker    Smokeless tobacco: Never Used   Substance and Sexual Activity    Alcohol use: No    Drug use: No    Sexual activity: Yes     Partners: Male   Lifestyle    Physical activity:     Days per week: Not on file     Minutes per session: Not on file    Stress: Not on file   Relationships    Social connections:     Talks on phone: Not on file     Gets together: Not on file     Attends Rastafari service: Not on file     Active member of club or organization: Not on file     Attends meetings of clubs or organizations: Not on file     Relationship status: Not on file    Intimate partner violence:     Fear of current or ex partner: Not on file     Emotionally abused: Not on file     Physically abused: Not on file     Forced sexual activity: Not on file   Other Topics Concern    Not on file   Social History Narrative    Not on file     Family History:       Problem Relation Age of Onset    No Known Problems Brother     No Known Problems Mother     No Known Problems Father     No Known Problems Maternal Grandmother     No Known Problems Maternal Grandfather     No Known Problems Paternal Grandmother     No Known Problems Paternal Grandfather     Other Brother         internal strep infection causing organ failure, age 15    No Known Problems Sister      Medications Prior to Admission:  Medications Prior to Admission: ferrous sulfate 325 (65 Fe) MG tablet, Take 1 tablet by mouth 2 times daily (with meals) (Patient taking differently: Take 325 mg by mouth daily (with breakfast) )  promethazine (PHENERGAN) 25 MG tablet, Take 1 tablet by mouth every 6 hours as needed for Nausea (Patient taking differently: Take 12.5 mg by mouth every 6 hours as needed for Nausea )  Prenatal MV-Min-Fe Fum-FA-DHA (PRENATAL 1 PO), Take by mouth daily  Lactobacillus (PROBIOTIC ACIDOPHILUS PO), Take by mouth  Misc.  Devices (BREAST PUMP) MISC, Medela double pump - plans breastfeeding  montelukast (SINGULAIR) 10 MG tablet, Take 1 tablet by mouth nightly  levalbuterol (XOPENEX HFA) 45 MCG/ACT inhaler, Inhale 1-2 puffs into the lungs every 4 hours as needed for Wheezing  Spacer/Aero-Holding Chambers RASHID, 1 Device by Does not apply route daily as needed (wheezing)    REVIEW OF SYSTEMS:    CONSTITUTIONAL:  negative  RESPIRATORY:  negative  CARDIOVASCULAR:  negative  GASTROINTESTINAL:  negative  ALLERGIC/IMMUNOLOGIC:  negative  NEUROLOGICAL:  negative  BEHAVIOR/PSYCH:  negative    PHYSICAL EXAM:  Vitals:    19 2222   BP: 133/82   Pulse: 93   Temp: 99.1 °F (37.3 °C)   TempSrc: Oral     General appearance:  awake, alert, cooperative, no apparent distress, and appears stated age  Neurologic:  Awake, alert, oriented to name, place and time. Lungs:  No increased work of breathing, good air exchange  Abdomen:  Soft, non tender, gravid, consistent with her gestational age, EFW by Ayaz's manouever was 7 1/2 lbs   Fetal heart rate:  Reassuring. Pelvis:  Adequate pelvis  Cervix: 6 cm Contraction frequency:  3 minutes    Membranes:  Ruptured clear fluid    Labs:   CBC:   Lab Results   Component Value Date    WBC 9.2 2019    RBC 3.63 2019    HGB 11.2 2019    HCT 34.4 2019    MCV 94.8 2019    MCH 30.9 2019    MCHC 32.6 2019    RDW 12.9 2019     2019    MPV 12.4 2019       ASSESSMENT AND PLAN:    Estimated length of stay: 2 days    Patient Active Hospital Problem List:   Normal labor and delivery (2019)          Labor: Admit, anticipate normal delivery, routine labor orders  Fetus: Reassuring, Cat 1  GBS: No  Other: anticipate       Darinel Fairly.  DANIELLE Wayne,2019 11:33 PM

## 2019-06-20 NOTE — PROGRESS NOTES
Patient progressed to 8-9 cm but still at -1 station, was noted to have a second bulging bag of water which was AROMd for copious clear fluid, after that time, ctxs spaced to 10-15 minutes. Pitocin augmentation initiated and now ctxs Q 2-4 on 5 mu/min. FHR cat 1. Anticipate  once ctxs adequate for descent. Is currently breathing through contractions well without urge to push.

## 2019-06-20 NOTE — PROGRESS NOTES
Lactation education resources given:       [x]  How to Breastfeed your baby - [address] publication      [x]  Information on feeding cues     [x]  Support group handout    [x]  Breastpump cleaning guidelines - CDC     [x]  Breastfeeding & Safe Sleep handout - [address] publication    [x]  Calling All Dads! Handout - [address] publication    []  Breast and Nipple Care - [employer]     []  [address]    []  [place]    []  Going Back to Work - [employer]    []  Preventing Engorgement - [employer]    Supplies given:    []  Brush, soap and basin for breastpump cleaning    []  Insurance pump provided through [facility]    []  Insurance pump provided through [employer] to patient, patient verbalizes understanding.         Signed:  Mu Santoro RN, BSN, IBCLC

## 2019-06-20 NOTE — PLAN OF CARE
Problem: Breathing Pattern - Ineffective:  Goal: Able to breathe comfortably  Description  Able to breathe comfortably  6/20/2019 0906 by Waldo Tapia RN  Outcome: Met This Shift  6/19/2019 2225 by Lauryn Mendoza RN  Outcome: Ongoing     Problem: Fluid Volume - Imbalance:  Goal: Absence of imbalanced fluid volume signs and symptoms  Description  Absence of imbalanced fluid volume signs and symptoms  6/20/2019 0906 by Waldo Tapia RN  Outcome: Met This Shift  6/19/2019 2225 by Lauryn Mendoza RN  Outcome: Ongoing  Goal: Absence of intrapartum hemorrhage signs and symptoms  Description  Absence of intrapartum hemorrhage signs and symptoms  6/20/2019 0906 by Waldo Tapia RN  Outcome: Met This Shift  6/19/2019 2225 by Lauryn Mendoza RN  Outcome: Ongoing

## 2019-06-20 NOTE — PROGRESS NOTES
Pt arrived to [facility] department with c/o rupture of membranes. States she feels the baby move as usual. Denies vaginal bleeding. Instructed to change into gown and provide a urine specimen if able. Pt agrees and denies further questions/concerns at this time.

## 2019-06-20 NOTE — L&D DELIVERY NOTE
Mother's Information    Labor Events     labor?:  No  Rupture type:  Artificial=AROM  Fluid color:  Clear  Fluid odor:  None     Mother Delivery Information    Episiotomy:  None  Repair Suture:  None  Vaginal Delivery Est. Blood Loss (mL):  350  Surgical or Additional Est. Blood Loss (mL):  0 (View Only):  Edit in Flowsheets   Combined Est. Blood Loss (mL):  [address], Baby Boy Sara Carrasco [[id number]]    Labor Events     labor?:  No   steroids?:  None  Cervical ripening date/time:     Antibiotics received during labor?:  No  Rupture Identifier:  Sac 1   Rupture date/time: 19 21:30:00   Rupture type:  Spontaneous=SROM  Fluid color:  Clear  Fluid odor:  None  Augmentation:  Oxytocin  Indications for augmentation:  Ineffective Contraction Pattern  Labor complications:  None          Labor Event Times    Labor onset date/time: 19   Dilation complete date/time:   19   Start [id number]   Decision time (emergent ):        Anesthesia    Method:  None     Assisted Delivery Details    Forceps attempted?:  No  Vacuum extractor attempted?:  No     Document Additional Attempt       Document Additional Attempt             Shoulder Dystocia    Shoulder dystocia present?:  No  Add Second Maneuver  Add Third Maneuver  Add Fourth Maneuver  Add Fifth Maneuver  Add Sixth Maneuver  Add Seventh Maneuver  Add Eighth Maneuver  Add Ninth Maneuver     Rousseau Presentation    Presentation:  Vertex  Position:  Left  _:  Occiput  _:  Anterior     Rousseau Information    Head delivery date/time:  2019 05:57:00   Changing the 's delivery date/time could affect patient care.:     Delivery date/time:   1959   Delivery type:  Vaginal, Spontaneous    Details:         Delivery Providers    Delivering clinician:  JENNI Kern CNM   Provider Role    Jeff Henson RN Delivery Nurse      Cord    Vessels:  3 Vessels  Complications: None  Cord around:  left lower extremity  Delayed cord clamping?:  Yes  Cord blood disposition:  Lab  Gases sent?:  No  Stem cell collection (by provider): No     Placenta    Removal:  Spontaneous  Appearance:  Intact  Disposition:  Discarded     Delivery Resuscitation    Method:  Bulb Suction, Stimulation     Apgars    Living status:  Living  Apgars   1 Minute:   5 Minute:   10 Minute 15 Minute 20 Minute   Skin Color:        Heart Rate:        Reflex Irritability:        Muscle Tone:        Respiratory Effort: Total:                    Measurements       Delivery Information    Episiotomy:  None  Vaginal laceration:  No    Cervical laceration:  No    Vaginal delivery est. blood loss (mL):  350  Surgical or additional est. blood loss (mL):  0 (View Only):  Edit in Flowsheets   Combined est. blood loss (mL):  350  Repair suture:  None     Vaginal Delivery Counts    Initial count personnel:  Faisal Ochoa RN  Initial count verified by:  Je Vega RN   4x4:   Needles:   Instruments:   Lap Pads:   Sponges:     Initial counts:          Final counts:                pt had \"skid nayeli\" at 6 oclock, not oozing, left unrepaired; after delivery of infant at about 20 minutes patient was painful and cord lengthened with gush of blood but patient clamping down legs and resistant to spontaneously pushing out placenta, on vaginal exam, entire placenta found to be in vaginal vault and removed from introitus appearing intact. Bleeding immediately  Subsided and patient relaxed and gave attention to nursing infant.

## 2019-06-21 PROCEDURE — 1220000000 HC SEMI PRIVATE OB R&B

## 2019-06-21 PROCEDURE — 6370000000 HC RX 637 (ALT 250 FOR IP): Performed by: ADVANCED PRACTICE MIDWIFE

## 2019-06-21 PROCEDURE — 99024 POSTOP FOLLOW-UP VISIT: CPT | Performed by: ADVANCED PRACTICE MIDWIFE

## 2019-06-21 RX ADMIN — IBUPROFEN 800 MG: 800 TABLET, FILM COATED ORAL at 23:34

## 2019-06-21 RX ADMIN — IBUPROFEN 800 MG: 800 TABLET, FILM COATED ORAL at 07:33

## 2019-06-21 RX ADMIN — IBUPROFEN 800 MG: 800 TABLET, FILM COATED ORAL at 15:33

## 2019-06-21 RX ADMIN — ACETAMINOPHEN 650 MG: 325 TABLET, FILM COATED ORAL at 03:25

## 2019-06-21 RX ADMIN — Medication: at 15:34

## 2019-06-21 ASSESSMENT — PAIN DESCRIPTION - DESCRIPTORS
DESCRIPTORS: CRAMPING
DESCRIPTORS: CRAMPING

## 2019-06-21 ASSESSMENT — PAIN SCALES - GENERAL
PAINLEVEL_OUTOF10: 4

## 2019-06-21 NOTE — PLAN OF CARE
Problem: Anxiety:  Goal: Level of anxiety will decrease  Description  Level of anxiety will decrease  Outcome: Ongoing     Problem: Fluid Volume - Imbalance:  Goal: Absence of postpartum hemorrhage signs and symptoms  Description  Absence of postpartum hemorrhage signs and symptoms  Outcome: Ongoing     Problem: Pain - Acute:  Goal: Pain level will decrease  Description  Pain level will decrease  Outcome: Ongoing  Goal: Able to cope with pain  Description  Able to cope with pain  Outcome: Ongoing     Problem: Urinary Retention:  Goal: Experiences of bladder distention will decrease  Description  Experiences of bladder distention will decrease  Outcome: Ongoing  Goal: Urinary elimination within specified parameters  Description  Urinary elimination within specified parameters  Outcome: Ongoing     Problem: Pain:  Goal: Pain level will decrease  Description  Pain level will decrease  Outcome: Ongoing  Goal: Control of acute pain  Description  Control of acute pain  Outcome: Ongoing  Goal: Control of chronic pain  Description  Control of chronic pain  Outcome: Ongoing     Problem: Discharge Planning:  Goal: Discharged to appropriate level of care  Description  Discharged to appropriate level of care  Outcome: Ongoing     Problem: Constipation:  Goal: Bowel elimination is within specified parameters  Description  Bowel elimination is within specified parameters  Outcome: Ongoing     Problem: Mood - Altered:  Goal: Mood stable  Description  Mood stable  Outcome: Ongoing

## 2019-06-21 NOTE — PLAN OF CARE
Problem: Anxiety:  Goal: Level of anxiety will decrease  Description  Level of anxiety will decrease  6/21/2019 0556 by David Donaldson RN  Outcome: Ongoing  6/20/2019 2316 by David Donaldson RN  Outcome: Ongoing     Problem: Fluid Volume - Imbalance:  Goal: Absence of postpartum hemorrhage signs and symptoms  Description  Absence of postpartum hemorrhage signs and symptoms  6/21/2019 0556 by David Donaldson RN  Outcome: Ongoing  6/20/2019 2316 by David Donaldson RN  Outcome: Ongoing     Problem: Pain - Acute:  Goal: Pain level will decrease  Description  Pain level will decrease  6/21/2019 0556 by David Donaldson RN  Outcome: Ongoing  6/20/2019 2316 by David Donaldson RN  Outcome: Ongoing  Goal: Able to cope with pain  Description  Able to cope with pain  6/21/2019 0556 by David Donaldson RN  Outcome: Ongoing  6/20/2019 2316 by David Donaldson RN  Outcome: Ongoing     Problem: Urinary Retention:  Goal: Experiences of bladder distention will decrease  Description  Experiences of bladder distention will decrease  6/21/2019 0556 by David Donaldson RN  Outcome: Ongoing  6/20/2019 2316 by David Donaldson RN  Outcome: Ongoing  Goal: Urinary elimination within specified parameters  Description  Urinary elimination within specified parameters  6/21/2019 0556 by David Donaldson RN  Outcome: Ongoing  6/20/2019 2316 by David Donaldson RN  Outcome: Ongoing     Problem: Pain:  Goal: Pain level will decrease  Description  Pain level will decrease  6/21/2019 0556 by David Donaldson RN  Outcome: Ongoing  6/20/2019 2316 by David Donaldson RN  Outcome: Ongoing  Goal: Control of acute pain  Description  Control of acute pain  6/21/2019 0556 by David Donaldson RN  Outcome: Ongoing  6/20/2019 2316 by David Donaldson RN  Outcome: Ongoing  Goal: Control of chronic pain  Description  Control of chronic pain  6/21/2019 0556 by David Donaldson RN  Outcome: Ongoing  6/20/2019 2316 by David Donaldson RN  Outcome: Ongoing     Problem: Discharge Planning:  Goal: Discharged to appropriate level of care  Description  Discharged to appropriate level of care  6/21/2019 0556 by Paxton Sousa RN  Outcome: Ongoing  6/20/2019 2316 by Paxton Sousa RN  Outcome: Ongoing     Problem: Constipation:  Goal: Bowel elimination is within specified parameters  Description  Bowel elimination is within specified parameters  6/21/2019 0556 by Paxton Sousa RN  Outcome: Ongoing  6/20/2019 2316 by Paxton Sousa RN  Outcome: Ongoing     Problem: Mood - Altered:  Goal: Mood stable  Description  Mood stable  6/21/2019 0556 by Paxton Sousa RN  Outcome: Ongoing  6/20/2019 2316 by Paxton Sousa RN  Outcome: Ongoing

## 2019-06-21 NOTE — PROGRESS NOTES
Department of Obstetrics and Gynecology  Labor and Delivery       Post Partum Progress Note             SUBJECTIVE:  1st day postpartum, denies c/o    OBJECTIVE:      Vitals:  BP 98/65   Pulse 94   Temp 97.7 °F (36.5 °C) (Oral)   Resp 16   LMP 09/23/2018 (Approximate)   Breastfeeding?  Unknown   Patient Vitals for the past 24 hrs:   BP Temp Temp src Pulse Resp   06/21/19 0714 98/65 97.7 °F (36.5 °C) Oral 94 16   06/21/19 0327 118/72 98 °F (36.7 °C) Temporal 93 14   06/20/19 2353 102/66 97.9 °F (36.6 °C) Temporal 97 18   06/20/19 2054 123/80 97.9 °F (36.6 °C) Temporal 91 16   06/20/19 1558 113/71 99.1 °F (37.3 °C) Oral 90 18   06/20/19 1141 119/74 98.4 °F (36.9 °C) Temporal 81 18         ABDOMEN: normal shape, position and consistency  GENITAL/URINARY:  External Genitalia:  General appearance; normal, Hair distribution; normal, Lesions absent  Uterus:  Size normal, Tenderness absent  Breast:normal appearance, no masses or tenderness  Cor: RRR no Murmurs  Pulmonary: clear to auscultation anterior and posterior  Extremities: no Clubbing cyanosis or ecchymosis    DATA:    CBC:   Lab Results   Component Value Date    WBC 9.2 06/19/2019    RBC 3.63 06/19/2019    HGB 11.2 06/19/2019    HCT 34.4 06/19/2019    MCV 94.8 06/19/2019    MCH 30.9 06/19/2019    MCHC 32.6 06/19/2019    RDW 12.9 06/19/2019     06/19/2019    MPV 12.4 06/19/2019         ASSESSMENT :      Patient Active Hospital Problem List:   Normal labor and delivery (6/19/2019)    Assessment: delivered      Normal delivery ()              Plan:  routine insturctions and orders

## 2019-06-22 VITALS
TEMPERATURE: 98 F | HEART RATE: 96 BPM | RESPIRATION RATE: 16 BRPM | SYSTOLIC BLOOD PRESSURE: 118 MMHG | DIASTOLIC BLOOD PRESSURE: 71 MMHG

## 2019-06-22 PROCEDURE — 99024 POSTOP FOLLOW-UP VISIT: CPT | Performed by: ADVANCED PRACTICE MIDWIFE

## 2019-06-22 ASSESSMENT — PAIN SCALES - GENERAL: PAINLEVEL_OUTOF10: 0

## 2019-06-22 NOTE — FLOWSHEET NOTE
Discharge instructions brought into room. Patient requests to read over herself. Denies any questions/concerns. Discharge paperwork signed.

## 2019-06-22 NOTE — PLAN OF CARE
Problem: Anxiety:  Goal: Level of anxiety will decrease  Description  Level of anxiety will decrease  Outcome: Ongoing     Problem: Fluid Volume - Imbalance:  Goal: Absence of postpartum hemorrhage signs and symptoms  Description  Absence of postpartum hemorrhage signs and symptoms  Outcome: Ongoing     Problem: Pain - Acute:  Goal: Pain level will decrease  Description  Pain level will decrease  Outcome: Ongoing     Problem: Pain:  Goal: Pain level will decrease  Description  Pain level will decrease  Outcome: Ongoing

## 2019-06-22 NOTE — PROGRESS NOTES
Department of Obstetrics and Gynecology  Labor and Delivery       Post Partum Progress Note             SUBJECTIVE:  2nd day postpartum, denies c/o, baby possibly staying for jaundice    OBJECTIVE:      Vitals:  /70   Pulse 82   Temp 97.9 °F (36.6 °C) (Oral)   Resp 18   LMP 09/23/2018 (Approximate)   Breastfeeding?  Unknown   Patient Vitals for the past 24 hrs:   BP Temp Temp src Pulse Resp   06/22/19 0319 105/70 97.9 °F (36.6 °C) Oral 82 18   06/21/19 2000 117/69 -- -- 85 --   06/21/19 1959 -- 97.4 °F (36.3 °C) Oral -- 16   06/21/19 1531 106/76 97.7 °F (36.5 °C) Oral 80 16         ABDOMEN: normal shape, position and consistency  GENITAL/URINARY:  External Genitalia:  General appearance; normal, Hair distribution; normal, Lesions absent  Uterus:  Size normal, Tenderness absent  Breast:normal appearance, no masses or tenderness  Cor: RRR no Murmurs  Pulmonary: clear to auscultation anterior and posterior  Extremities: no Clubbing cyanosis or ecchymosis    DATA:    CBC:   Lab Results   Component Value Date    WBC 9.2 06/19/2019    RBC 3.63 06/19/2019    HGB 11.2 06/19/2019    HCT 34.4 06/19/2019    MCV 94.8 06/19/2019    MCH 30.9 06/19/2019    MCHC 32.6 06/19/2019    RDW 12.9 06/19/2019     06/19/2019    MPV 12.4 06/19/2019         ASSESSMENT :      Patient Active Hospital Problem List:   Normal labor and delivery (6/19/2019)    Assessment: delivered       Normal delivery ()        Plan:  D/c home, rto 6 weeks, routine instructions

## 2019-06-22 NOTE — DISCHARGE SUMMARY
Obstetrical Discharge Form        Gestational Age:39w0d    Antepartum complications: none    Date of Delivery: 19    Type of Delivery:        Delivered By:  Devera Burkitt CNM    Assisted By:N/A      Baby: male    Anesthesia:  none      Intrapartum complications: None    Feeding method: breast    Blood type: AB POSITIVE      Rubella:    Rubella Antibody, IGG   Date Value Ref Range Status   2018 240.4 IU/mL Final     Comment:                 REFERENCE RANGE:  <5.0       NON-REACTIVE (non-immune)  5.0 TO 9.9 EQUIVOCAL  >=10.0     REACTIVE     (immune)             T. Pallidium, IGG:    T. pallidum, IgG   Date Value Ref Range Status   2018 NONREACTIVE NR Final     Comment:           T. pallidum antibodies are not detected. There is no serological evidence of infection with T. pallidum (early primary   syphilis cannot be excluded). Retest in 2-4 weeks if syphilis is clinically   suspect. Hepatitis B Surface Antigen:   Hepatitis B Surface Ag   Date Value Ref Range Status   2018 NONREACTIVE NR Final     HIV:  No results found for: CKX87XM    Postpartum complications: none    Discharge Medication:    Racquel Mcgowan   Home Medication Instructions MCP:[id number]    Printed on:19 6249   Medication Information                      ferrous sulfate 325 (65 Fe) MG tablet  Take 1 tablet by mouth 2 times daily (with meals)             Lactobacillus (PROBIOTIC ACIDOPHILUS PO)  Take by mouth             levalbuterol (XOPENEX HFA) 45 MCG/ACT inhaler  Inhale 1-2 puffs into the lungs every 4 hours as needed for Wheezing             Misc.  Devices (BREAST PUMP) MISC  Medela double pump - plans breastfeeding             montelukast (SINGULAIR) 10 MG tablet  Take 1 tablet by mouth nightly             Prenatal MV-Min-Fe Fum-FA-DHA (PRENATAL 1 PO)  Take by mouth daily             promethazine (PHENERGAN) 25 MG tablet  Take 1 tablet by mouth every 6 hours as needed for Nausea Spacer/Aero-Holding Chambers RASHID  1 Device by Does not apply route daily as needed (wheezing)                    Admit date: 6/19/2019 10:12 PM    Discharge Date: 6/22/2019    Discharged to: Home         Plan:   Follow up 6 weeks postpartum

## 2019-08-05 ENCOUNTER — POSTPARTUM VISIT (OUTPATIENT)
Dept: OBGYN | Age: 29
End: 2019-08-05

## 2019-08-05 VITALS
HEIGHT: 63 IN | DIASTOLIC BLOOD PRESSURE: 74 MMHG | BODY MASS INDEX: 24.95 KG/M2 | SYSTOLIC BLOOD PRESSURE: 118 MMHG | WEIGHT: 140.8 LBS

## 2019-08-05 LAB — HGB, POC: 12.7

## 2019-08-05 PROCEDURE — 0503F POSTPARTUM CARE VISIT: CPT | Performed by: ADVANCED PRACTICE MIDWIFE

## 2019-08-05 RX ORDER — ACETAMINOPHEN AND CODEINE PHOSPHATE 120; 12 MG/5ML; MG/5ML
1 SOLUTION ORAL DAILY
Qty: 28 TABLET | Refills: 12 | Status: SHIPPED | OUTPATIENT
Start: 2019-08-05 | End: 2019-10-16 | Stop reason: ALTCHOICE

## 2019-08-05 RX ORDER — SERTRALINE HYDROCHLORIDE 25 MG/1
25 TABLET, FILM COATED ORAL DAILY
Qty: 30 TABLET | Refills: 1 | Status: SHIPPED | OUTPATIENT
Start: 2019-08-05 | End: 2021-11-02 | Stop reason: CLARIF

## 2019-08-27 LAB
ALT SERPL-CCNC: 27 U/L
AST SERPL-CCNC: 22 U/L
BASOPHILS ABSOLUTE: NORMAL /ΜL
BASOPHILS RELATIVE PERCENT: NORMAL %
BUN BLDV-MCNC: 19 MG/DL
CALCIUM SERPL-MCNC: 9.7 MG/DL
CHLORIDE BLD-SCNC: 104 MMOL/L
CHOLESTEROL, TOTAL: 208 MG/DL
CHOLESTEROL/HDL RATIO: 3.2
CO2: 25 MMOL/L
CREAT SERPL-MCNC: 0.7 MG/DL
EOSINOPHILS ABSOLUTE: NORMAL /ΜL
EOSINOPHILS RELATIVE PERCENT: NORMAL %
GFR CALCULATED: NORMAL
GLUCOSE BLD-MCNC: 96 MG/DL
HCT VFR BLD CALC: 40.4 % (ref 36–46)
HDLC SERPL-MCNC: 66 MG/DL (ref 35–70)
HEMOGLOBIN: 12.6 G/DL (ref 12–16)
LDL CHOLESTEROL CALCULATED: 126 MG/DL (ref 0–160)
LYMPHOCYTES ABSOLUTE: NORMAL /ΜL
LYMPHOCYTES RELATIVE PERCENT: NORMAL %
MCH RBC QN AUTO: NORMAL PG
MCHC RBC AUTO-ENTMCNC: NORMAL G/DL
MCV RBC AUTO: NORMAL FL
MONOCYTES ABSOLUTE: NORMAL /ΜL
MONOCYTES RELATIVE PERCENT: NORMAL %
NEUTROPHILS ABSOLUTE: NORMAL /ΜL
NEUTROPHILS RELATIVE PERCENT: NORMAL %
PLATELET # BLD: 245 K/ΜL
PMV BLD AUTO: 11.5 FL
POTASSIUM SERPL-SCNC: 4.2 MMOL/L
RBC # BLD: NORMAL 10^6/ΜL
SODIUM BLD-SCNC: 143 MMOL/L
TRIGL SERPL-MCNC: 80 MG/DL
VLDLC SERPL CALC-MCNC: NORMAL MG/DL
WBC # BLD: 4.9 10^3/ML

## 2019-09-10 ENCOUNTER — OFFICE VISIT (OUTPATIENT)
Dept: OBGYN | Age: 29
End: 2019-09-10
Payer: COMMERCIAL

## 2019-09-10 VITALS
HEIGHT: 63 IN | WEIGHT: 138.2 LBS | BODY MASS INDEX: 24.49 KG/M2 | SYSTOLIC BLOOD PRESSURE: 110 MMHG | DIASTOLIC BLOOD PRESSURE: 62 MMHG

## 2019-09-10 PROCEDURE — 99212 OFFICE O/P EST SF 10 MIN: CPT | Performed by: ADVANCED PRACTICE MIDWIFE

## 2019-09-10 RX ORDER — SERTRALINE HYDROCHLORIDE 25 MG/1
25 TABLET, FILM COATED ORAL DAILY
Qty: 30 TABLET | Refills: 2 | Status: SHIPPED | OUTPATIENT
Start: 2019-09-10 | End: 2019-10-16 | Stop reason: ALTCHOICE

## 2019-09-10 NOTE — PROGRESS NOTES
am also having her start on sertraline. Additionally, I am having her maintain her Lactobacillus (PROBIOTIC ACIDOPHILUS PO), Prenatal MV-Min-Fe Fum-FA-DHA (PRENATAL 1 PO), montelukast, Breast Pump, norethindrone, and sertraline. Return in about 3 months (around 12/10/2019) for yearly. There are no Patient Instructions on file for this visit. Over 50% of time spent on counseling and care coordination on: see assessment and plan,  She was also counseled on her preventative health maintenance recommendations and follow-up.         FF time: 10 min      Romayne Graver Smith,9/10/2019 1:38 PM

## 2019-10-16 ENCOUNTER — OFFICE VISIT (OUTPATIENT)
Dept: FAMILY MEDICINE CLINIC | Age: 29
End: 2019-10-16
Payer: COMMERCIAL

## 2019-10-16 VITALS
BODY MASS INDEX: 24.63 KG/M2 | SYSTOLIC BLOOD PRESSURE: 110 MMHG | DIASTOLIC BLOOD PRESSURE: 62 MMHG | HEIGHT: 63 IN | WEIGHT: 139 LBS

## 2019-10-16 DIAGNOSIS — Z87.01 HISTORY OF PNEUMONIA: Primary | ICD-10-CM

## 2019-10-16 DIAGNOSIS — Z00.00 ROUTINE GENERAL MEDICAL EXAMINATION AT A HEALTH CARE FACILITY: ICD-10-CM

## 2019-10-16 PROCEDURE — 99395 PREV VISIT EST AGE 18-39: CPT | Performed by: FAMILY MEDICINE

## 2019-10-19 ENCOUNTER — HOSPITAL ENCOUNTER (OUTPATIENT)
Age: 29
Discharge: HOME | End: 2019-10-21
Payer: COMMERCIAL

## 2019-10-19 ENCOUNTER — HOSPITAL ENCOUNTER (OUTPATIENT)
Dept: GENERAL RADIOLOGY | Age: 29
Discharge: HOME | End: 2019-10-21
Payer: COMMERCIAL

## 2019-10-19 DIAGNOSIS — Z87.01 HISTORY OF PNEUMONIA: ICD-10-CM

## 2019-10-19 PROCEDURE — 71046 X-RAY EXAM CHEST 2 VIEWS: CPT

## 2019-11-15 PROBLEM — Z00.00 ROUTINE GENERAL MEDICAL EXAMINATION AT A HEALTH CARE FACILITY: Status: RESOLVED | Noted: 2019-10-16 | Resolved: 2019-11-15

## 2019-11-18 ENCOUNTER — NURSE ONLY (OUTPATIENT)
Dept: FAMILY MEDICINE CLINIC | Age: 29
End: 2019-11-18
Payer: COMMERCIAL

## 2019-11-18 DIAGNOSIS — Z23 NEED FOR INFLUENZA VACCINATION: Primary | ICD-10-CM

## 2019-11-18 PROCEDURE — 90471 IMMUNIZATION ADMIN: CPT | Performed by: FAMILY MEDICINE

## 2019-11-18 PROCEDURE — 90688 IIV4 VACCINE SPLT 0.5 ML IM: CPT | Performed by: FAMILY MEDICINE

## 2019-12-12 ENCOUNTER — OFFICE VISIT (OUTPATIENT)
Dept: OBGYN | Age: 29
End: 2019-12-12
Payer: COMMERCIAL

## 2019-12-12 ENCOUNTER — HOSPITAL ENCOUNTER (OUTPATIENT)
Age: 29
Setting detail: SPECIMEN
Discharge: HOME | End: 2019-12-12
Payer: COMMERCIAL

## 2019-12-12 VITALS
DIASTOLIC BLOOD PRESSURE: 66 MMHG | WEIGHT: 141 LBS | BODY MASS INDEX: 24.98 KG/M2 | SYSTOLIC BLOOD PRESSURE: 118 MMHG | HEIGHT: 63 IN

## 2019-12-12 DIAGNOSIS — Z01.419 ENCOUNTER FOR ANNUAL ROUTINE GYNECOLOGICAL EXAMINATION: ICD-10-CM

## 2019-12-12 DIAGNOSIS — R10.2 VAGINAL PAIN: Primary | ICD-10-CM

## 2019-12-12 DIAGNOSIS — R10.2 VAGINAL PAIN: ICD-10-CM

## 2019-12-12 PROCEDURE — 87070 CULTURE OTHR SPECIMN AEROBIC: CPT

## 2019-12-12 PROCEDURE — 99395 PREV VISIT EST AGE 18-39: CPT | Performed by: ADVANCED PRACTICE MIDWIFE

## 2019-12-15 ENCOUNTER — TELEPHONE (OUTPATIENT)
Dept: OBGYN | Age: 29
End: 2019-12-15

## 2019-12-15 LAB
CULTURE: NORMAL
Lab: NORMAL
SPECIMEN DESCRIPTION: NORMAL

## 2019-12-15 ASSESSMENT — ENCOUNTER SYMPTOMS
BACK PAIN: 0
ABDOMINAL PAIN: 0
SHORTNESS OF BREATH: 0

## 2020-08-25 LAB
ALT SERPL-CCNC: 13 U/L
AST SERPL-CCNC: 14 U/L
BASOPHILS ABSOLUTE: NORMAL
BASOPHILS RELATIVE PERCENT: NORMAL
BUN BLDV-MCNC: 15 MG/DL
CALCIUM SERPL-MCNC: 9.2 MG/DL
CHLORIDE BLD-SCNC: 104 MMOL/L
CHOLESTEROL, TOTAL: 173 MG/DL
CHOLESTEROL/HDL RATIO: 2.7
CO2: 24 MMOL/L
CREAT SERPL-MCNC: 0.6 MG/DL
EOSINOPHILS ABSOLUTE: NORMAL
EOSINOPHILS RELATIVE PERCENT: NORMAL
GFR CALCULATED: NORMAL
GLUCOSE BLD-MCNC: 91 MG/DL
HCT VFR BLD CALC: 40.1 % (ref 36–46)
HDLC SERPL-MCNC: 65 MG/DL (ref 35–70)
HEMOGLOBIN: 12.7 G/DL (ref 12–16)
LDL CHOLESTEROL CALCULATED: 99 MG/DL (ref 0–160)
LYMPHOCYTES ABSOLUTE: NORMAL
LYMPHOCYTES RELATIVE PERCENT: NORMAL
MCH RBC QN AUTO: NORMAL PG
MCHC RBC AUTO-ENTMCNC: NORMAL G/DL
MCV RBC AUTO: NORMAL FL
MONOCYTES ABSOLUTE: NORMAL
MONOCYTES RELATIVE PERCENT: NORMAL
NEUTROPHILS ABSOLUTE: NORMAL
NEUTROPHILS RELATIVE PERCENT: NORMAL
NONHDLC SERPL-MCNC: NORMAL MG/DL
PLATELET # BLD: 235 K/ΜL
PMV BLD AUTO: 11.4 FL
POTASSIUM SERPL-SCNC: 4.4 MMOL/L
RBC # BLD: 4.06 10^6/ΜL
SODIUM BLD-SCNC: 137 MMOL/L
TRIGL SERPL-MCNC: 43 MG/DL
VLDLC SERPL CALC-MCNC: NORMAL MG/DL
WBC # BLD: 5 10^3/ML

## 2020-11-02 ENCOUNTER — OFFICE VISIT (OUTPATIENT)
Dept: FAMILY MEDICINE CLINIC | Age: 30
End: 2020-11-02
Payer: COMMERCIAL

## 2020-11-02 VITALS
SYSTOLIC BLOOD PRESSURE: 112 MMHG | HEART RATE: 92 BPM | DIASTOLIC BLOOD PRESSURE: 70 MMHG | BODY MASS INDEX: 24.45 KG/M2 | HEIGHT: 63 IN | WEIGHT: 138 LBS | OXYGEN SATURATION: 97 %

## 2020-11-02 PROCEDURE — 90471 IMMUNIZATION ADMIN: CPT | Performed by: FAMILY MEDICINE

## 2020-11-02 PROCEDURE — 99395 PREV VISIT EST AGE 18-39: CPT | Performed by: FAMILY MEDICINE

## 2020-11-02 PROCEDURE — 90685 IIV4 VACC NO PRSV 0.25 ML IM: CPT | Performed by: FAMILY MEDICINE

## 2020-11-02 RX ORDER — ALPRAZOLAM 0.25 MG/1
0.25 TABLET ORAL DAILY PRN
Qty: 20 TABLET | Refills: 0 | Status: SHIPPED | OUTPATIENT
Start: 2020-11-02 | End: 2021-01-01

## 2020-11-02 SDOH — ECONOMIC STABILITY: INCOME INSECURITY: HOW HARD IS IT FOR YOU TO PAY FOR THE VERY BASICS LIKE FOOD, HOUSING, MEDICAL CARE, AND HEATING?: NOT HARD AT ALL

## 2020-11-02 SDOH — ECONOMIC STABILITY: FOOD INSECURITY: WITHIN THE PAST 12 MONTHS, YOU WORRIED THAT YOUR FOOD WOULD RUN OUT BEFORE YOU GOT MONEY TO BUY MORE.: NEVER TRUE

## 2020-11-02 SDOH — ECONOMIC STABILITY: FOOD INSECURITY: WITHIN THE PAST 12 MONTHS, THE FOOD YOU BOUGHT JUST DIDN'T LAST AND YOU DIDN'T HAVE MONEY TO GET MORE.: NEVER TRUE

## 2020-11-02 ASSESSMENT — PATIENT HEALTH QUESTIONNAIRE - PHQ9
SUM OF ALL RESPONSES TO PHQ QUESTIONS 1-9: 0
SUM OF ALL RESPONSES TO PHQ QUESTIONS 1-9: 0
1. LITTLE INTEREST OR PLEASURE IN DOING THINGS: 0
2. FEELING DOWN, DEPRESSED OR HOPELESS: 0
SUM OF ALL RESPONSES TO PHQ9 QUESTIONS 1 & 2: 0
SUM OF ALL RESPONSES TO PHQ QUESTIONS 1-9: 0

## 2020-11-02 NOTE — PATIENT INSTRUCTIONS
We discussed her anxiety  I will give her Xanax 0.25 mg to take as needed #20  I recommend some mindfulness routine to help with this  She otherwise looks good today and her labs are good  I will see her back in 1 year    SURVEY:    You may be receiving a survey from [employer] regarding your visit today. Please complete the survey to enable us to provide the highest quality of care to you and your family. If you cannot score us a very good on any question, please call the office to discuss how we could of made your experience a very good one. Thank you.

## 2020-11-02 NOTE — PROGRESS NOTES
I, Sajan Oquendo, CESAR, am scribing for and in the presence of Dr. Deandre Sanches. 11/2/20/9:05 am/JML    [address] [address]  [place] [id number]  Dept: [phone]    Kimmy Aranda is a 26 y.o. female here for Annual Exam (Patient comes in for wellness. She is also intered in an anxiety medication. )       HPI:  Pt is here for her wellness exam  She has had anxiety for years and is interested in treatment    Prior to Admission medications    Medication Sig Start Date End Date Taking? Authorizing Provider   sertraline (ZOLOFT) 25 MG tablet Take 1 tablet by mouth daily  Patient not taking: Reported on 11/2/2020 8/5/19   JENNI Bey CNM       ROS:  General Constitutional: Denies chills. Denies fever. Denies headache. Denies lightheadedness. Ophthalmologic: Denies blurred vision. ENT: Denies nasal congestion. Denies sore throat. Denies ear pain and pressure. Respiratory: Denies cough. Denies shortness of breath. Denies wheezing. Cardiovascular: Denies chest pain at rest. Denies irregular heartbeat. Denies palpitations. Gastrointestinal: Denies abdominal pain. Denies blood in the stool. Denies constipation. Denies diarrhea. Denies nausea. Denies vomiting. Genitourinary: Denies blood in the urine. Denies difficulty urinating. Denies frequent urination. Denies painful urination. Denies urinary incontinence. Musculoskeletal: Denies muscle aches. Denies painful joints. Denies swollen joints. Peripheral Vascular: Denies pain/cramping in legs after exertion. Skin: Denies dry skin. Denies itching. Denies rash. Neurologic: Denies falls. Denies dizziness. Denies fainting. Denies tingling/numbness. Psychiatric: Denies sleep disturbance. Admits anxiety, has missed work 3x for this, does not want something long term but something she can take just for rare occasions. Denies depressed mood.     Past Surgical History:   Procedure Laterality Date   

## 2020-12-15 ENCOUNTER — HOSPITAL ENCOUNTER (OUTPATIENT)
Age: 30
Setting detail: SPECIMEN
Discharge: HOME | End: 2020-12-15
Payer: COMMERCIAL

## 2020-12-15 ENCOUNTER — OFFICE VISIT (OUTPATIENT)
Dept: OBGYN | Age: 30
End: 2020-12-15
Payer: COMMERCIAL

## 2020-12-15 VITALS
WEIGHT: 141.6 LBS | BODY MASS INDEX: 25.09 KG/M2 | DIASTOLIC BLOOD PRESSURE: 84 MMHG | HEIGHT: 63 IN | SYSTOLIC BLOOD PRESSURE: 130 MMHG

## 2020-12-15 PROCEDURE — 87624 HPV HI-RISK TYP POOLED RSLT: CPT

## 2020-12-15 PROCEDURE — 99395 PREV VISIT EST AGE 18-39: CPT | Performed by: ADVANCED PRACTICE MIDWIFE

## 2020-12-15 PROCEDURE — G0145 SCR C/V CYTO,THINLAYER,RESCR: HCPCS

## 2020-12-15 SDOH — HEALTH STABILITY: MENTAL HEALTH: HOW MANY STANDARD DRINKS CONTAINING ALCOHOL DO YOU HAVE ON A TYPICAL DAY?: NOT ASKED

## 2020-12-15 SDOH — HEALTH STABILITY: MENTAL HEALTH: HOW OFTEN DO YOU HAVE A DRINK CONTAINING ALCOHOL?: NOT ASKED

## 2020-12-15 ASSESSMENT — ENCOUNTER SYMPTOMS
BACK PAIN: 0
SHORTNESS OF BREATH: 0
ABDOMINAL PAIN: 0

## 2020-12-15 NOTE — PROGRESS NOTES
YEARLY PHYSICAL    Date of service: 12/15/2020    Victoria Flowerssin  Is a 26 y.o.   female    PT's PCP is: David Tucker MD     : 1990                                             Subjective:       Patient's last menstrual period was 2020 (approximate). Are your menses regular: yes    OB History    Para Term  AB Living   4 3 3   1 3   SAB TAB Ectopic Molar Multiple Live Births   1       0 3      # Outcome Date GA Lbr Robin/2nd Weight Sex Delivery Anes PTL Lv   4 Term 19 39w0d 08:18 / 00:09 8 lb 8.8 oz (3.878 kg) M Vag-Spont None N VIOLETA   3 Term 14 39w5d 07:01 8 lb 6.1 oz (3.802 kg) F Vag-Spont EPI  VIOLETA   2 SAB  9w0d             Birth Comments: 13 week D&C   1 Term 11 41w0d  9 lb 4 oz (4.196 kg) M Vag-Spont EPI N VIOLETA        Social History     Tobacco Use   Smoking Status Never Smoker   Smokeless Tobacco Never Used        Social History     Substance and Sexual Activity   Alcohol Use Yes    Alcohol/week: 3.0 standard drinks    Types: 3 Glasses of wine per week       Family History   Problem Relation Age of Onset    No Known Problems Brother     No Known Problems Mother     Cancer Father         skin    No Known Problems Maternal Grandmother     No Known Problems Maternal Grandfather     No Known Problems Paternal Grandmother     No Known Problems Paternal Grandfather     Other Brother         internal strep infection causing organ failure, age 15    No Known Problems Sister        Any family history of breast or ovarian cancer: No    Any family history of blood clots: No      Allergies: Cashews [macadamia nut oil] and Bonifacio flavor      Current Outpatient Medications:     ALPRAZolam (XANAX) 0.25 MG tablet, Take 1 tablet by mouth daily as needed for Anxiety for up to 60 days. , Disp: 20 tablet, Rfl: 0   sertraline (ZOLOFT) 25 MG tablet, Take 1 tablet by mouth daily (Patient not taking: Reported on 2020), Disp: 30 tablet, Rfl: 1    Social History     Substance and Sexual Activity   Sexual Activity Yes    Partners: Male       Any bleeding or pain with intercourse: No    Last Yearly:  2019    Last pap: 2017    Last HPV: na    Last Mammogram: na    Last Dexascan na    Last colorectal screen- type:na*  date  na    Do you do self breast exams: Yes    Past Medical History:   Diagnosis Date    Anemia during pregnancy in second trimester 2019    Breast disorder     Jeremiah Sharp infection     Exacerbation of rad (reactive airway disease), mild intermittent 3/1/2019       Past Surgical History:   Procedure Laterality Date    DILATION AND CURETTAGE      DILATION AND CURETTAGE OF UTERUS  11    missed        Family History   Problem Relation Age of Onset    No Known Problems Brother     No Known Problems Mother     Cancer Father         skin    No Known Problems Maternal Grandmother     No Known Problems Maternal Grandfather     No Known Problems Paternal Grandmother     No Known Problems Paternal Grandfather     Other Brother         internal strep infection causing organ failure, age 15   [facility] No Known Problems Sister        Chief Complaint   Patient presents with    Gynecologic Exam     Yearly exam. Last pap  negative, HPV ? C/O heavy painful periods and pressure. C/O headaches. Patient is breastfeeding once a day. Patient has been seeing chiropractor. PE:  Vital Signs  Blood pressure 130/84, height 5' 3\" (1.6 m), weight 141 lb 9.6 oz (64.2 kg), last menstrual period 2020, currently breastfeeding. Estimated body mass index is 25.08 kg/m² as calculated from the following:    Height as of this encounter: 5' 3\" (1.6 m). Weight as of this encounter: 141 lb 9.6 oz (64.2 kg). Labs:    No results found for this visit on 12/15/20.       NURSE: Dinh ALONZO HPI: here for annual gyn exam    Review of Systems   Constitutional: Negative. HENT: Negative for congestion. Respiratory: Negative for shortness of breath. Cardiovascular: Negative for chest pain. Gastrointestinal: Negative for abdominal pain. Genitourinary: Negative for dysuria. Musculoskeletal: Negative for back pain. Skin: Negative for rash. Neurological: Negative for headaches. Psychiatric/Behavioral: The patient is not nervous/anxious. Objective  Lymphatic:   no lymphadenopathy  Heent:   negative   Cor: regular rate and rhythm, no murmurs              Pul:clear to auscultation bilaterally- no wheezes, rales or rhonchi, normal air movement, no respiratory distress      GI: Abdomen soft, non-tender. BS normal. No masses,  No organomegaly           Extremities: normal strength, tone, and muscle mass   Breasts: Breast:normal appearance, no masses or tenderness   Pelvic Exam: GENITAL/URINARY:  External Genitalia:  General appearance; normal, Hair distribution; normal, Lesions absent  Urethral Meatus:  Size normal, Location normal, Lesions absent, Prolapse absent  Urethra: Fullness absent, Masses absent  Bladder:  Fullness absent, Masses absent, Tenderness absent, Cystocele absent  Vagina:  General appearance normal, Estrogen effect normal, Discharge absent, Lesions absent, Pelvic support normal  Cervix:  General appearance normal, Lesions absent, Discharge absent, Tenderness absent, Enlargement absent, Nodularity absent  Uterus:  Size normal, Tenderness absent  Adenexa: Masses absent, Tenderness absent  Anus/Perineum:  Lesions absent and Masses absent                                                             Assessment and Plan          Diagnosis Orders   1. Encounter for annual routine gynecological examination     2.  Dysmenorrhea  US NON OB TRANSVAGINAL   3. Screening for malignant neoplasm of cervix  PAP SMEAR if gyn sono normal, consider cyclic progesterone or cyclic combined bioidentical hormones      I am having Jeane HOGANSánchez Beebe maintain her sertraline and ALPRAZolam.    Return for will call to schedule gyn sono 1 week after next period. She was also counseled on her preventative health maintenance recommendations and follow-up. There are no Patient Instructions on file for this visit.     Kimmy Wayne,12/15/2020 9:25 AM

## 2020-12-18 LAB
HPV SOURCE: NORMAL
HPV, GENOTYPE 16: NOT DETECTED
HPV, GENOTYPE 18: NOT DETECTED
HPV, HIGH RISK OTHER: NOT DETECTED

## 2020-12-22 LAB — CYTOLOGY REPORT: NORMAL

## 2021-07-21 ENCOUNTER — HOSPITAL ENCOUNTER (OUTPATIENT)
Age: 31
Discharge: HOME | End: 2021-07-21
Payer: COMMERCIAL

## 2021-07-21 ENCOUNTER — TELEPHONE (OUTPATIENT)
Dept: OBGYN | Age: 31
End: 2021-07-21

## 2021-07-21 DIAGNOSIS — O03.9 SAB (SPONTANEOUS ABORTION): ICD-10-CM

## 2021-07-21 DIAGNOSIS — O03.9 SAB (SPONTANEOUS ABORTION): Primary | ICD-10-CM

## 2021-07-21 LAB
ABSOLUTE EOS #: 0.26 K/UL (ref 0–0.44)
ABSOLUTE IMMATURE GRANULOCYTE: <0.03 K/UL (ref 0–0.3)
ABSOLUTE LYMPH #: 2.7 K/UL (ref 1.1–3.7)
ABSOLUTE MONO #: 0.52 K/UL (ref 0.1–1.2)
BASOPHILS # BLD: 1 % (ref 0–2)
BASOPHILS ABSOLUTE: 0.04 K/UL (ref 0–0.2)
DIFFERENTIAL TYPE: ABNORMAL
EOSINOPHILS RELATIVE PERCENT: 4 % (ref 1–4)
HCG QUANTITATIVE: 202 IU/L
HCT VFR BLD CALC: 35 % (ref 36.3–47.1)
HEMOGLOBIN: 11.2 G/DL (ref 11.9–15.1)
IMMATURE GRANULOCYTES: 0 %
LYMPHOCYTES # BLD: 43 % (ref 24–43)
MCH RBC QN AUTO: 31.2 PG (ref 25.2–33.5)
MCHC RBC AUTO-ENTMCNC: 32 G/DL (ref 28.4–34.8)
MCV RBC AUTO: 97.5 FL (ref 82.6–102.9)
MONOCYTES # BLD: 8 % (ref 3–12)
NRBC AUTOMATED: 0 PER 100 WBC
PDW BLD-RTO: 11.9 % (ref 11.8–14.4)
PLATELET # BLD: 285 K/UL (ref 138–453)
PLATELET ESTIMATE: ABNORMAL
PMV BLD AUTO: 10.5 FL (ref 8.1–13.5)
RBC # BLD: 3.59 M/UL (ref 3.95–5.11)
RBC # BLD: ABNORMAL 10*6/UL
SEG NEUTROPHILS: 44 % (ref 36–65)
SEGMENTED NEUTROPHILS ABSOLUTE COUNT: 2.8 K/UL (ref 1.5–8.1)
WBC # BLD: 6.3 K/UL (ref 3.5–11.3)
WBC # BLD: ABNORMAL 10*3/UL

## 2021-07-21 PROCEDURE — 85025 COMPLETE CBC W/AUTO DIFF WBC: CPT

## 2021-07-21 PROCEDURE — 36415 COLL VENOUS BLD VENIPUNCTURE: CPT | Performed by: ADVANCED PRACTICE MIDWIFE

## 2021-07-21 PROCEDURE — 36415 COLL VENOUS BLD VENIPUNCTURE: CPT

## 2021-07-21 PROCEDURE — 84702 CHORIONIC GONADOTROPIN TEST: CPT

## 2021-07-21 NOTE — TELEPHONE ENCOUNTER
Patient states she was about 10 weeks pregnant and miscarried while she was out of town 3 weeks ago. She never came in for an appointment. She stopped bleeding for about 4 days, but yesterday began having very heavy bleeding and has been going through super plus tampons every 1 1/2 - 2 hours. She says this is bleeding is different than her normal period and feels it is too soon to be a period. Please advise. Pt returned nurse call. Please call pt back to advise.

## 2021-07-22 ENCOUNTER — TELEPHONE (OUTPATIENT)
Dept: OBGYN | Age: 31
End: 2021-07-22

## 2021-07-22 NOTE — TELEPHONE ENCOUNTER
She may be still coming down from the miscarriage, she may be pregnant again or she may have retained products. I will know more once we get the second value in 48 hours.   We can also order a sono to look for retained products etc.

## 2021-07-22 NOTE — TELEPHONE ENCOUNTER
Spoke with patient. She is going to have repeat quant drawn tomorrow and is scheduled at 10 am for a Ultrasound.

## 2021-07-23 ENCOUNTER — ANCILLARY PROCEDURE (OUTPATIENT)
Dept: OBGYN | Age: 31
End: 2021-07-23
Payer: COMMERCIAL

## 2021-07-23 ENCOUNTER — OFFICE VISIT (OUTPATIENT)
Dept: OBGYN | Age: 31
End: 2021-07-23
Payer: COMMERCIAL

## 2021-07-23 ENCOUNTER — HOSPITAL ENCOUNTER (OUTPATIENT)
Age: 31
Discharge: HOME | End: 2021-07-23
Payer: COMMERCIAL

## 2021-07-23 VITALS
WEIGHT: 141 LBS | DIASTOLIC BLOOD PRESSURE: 78 MMHG | BODY MASS INDEX: 24.98 KG/M2 | SYSTOLIC BLOOD PRESSURE: 120 MMHG | HEIGHT: 63 IN

## 2021-07-23 DIAGNOSIS — N93.9 EPISODE OF HEAVY VAGINAL BLEEDING: ICD-10-CM

## 2021-07-23 DIAGNOSIS — O03.4 INCOMPLETE ABORTION: Primary | ICD-10-CM

## 2021-07-23 DIAGNOSIS — O03.9 SAB (SPONTANEOUS ABORTION): ICD-10-CM

## 2021-07-23 DIAGNOSIS — O03.9 MISCARRIAGE: ICD-10-CM

## 2021-07-23 LAB — HCG QUANTITATIVE: 142 IU/L

## 2021-07-23 PROCEDURE — 84702 CHORIONIC GONADOTROPIN TEST: CPT

## 2021-07-23 PROCEDURE — 76830 TRANSVAGINAL US NON-OB: CPT | Performed by: OBSTETRICS & GYNECOLOGY

## 2021-07-23 PROCEDURE — 36415 COLL VENOUS BLD VENIPUNCTURE: CPT

## 2021-07-23 PROCEDURE — 99213 OFFICE O/P EST LOW 20 MIN: CPT | Performed by: ADVANCED PRACTICE MIDWIFE

## 2021-07-23 RX ORDER — ALPRAZOLAM 0.5 MG/1
TABLET ORAL
COMMUNITY
Start: 2021-01-01 | End: 2022-01-12

## 2021-07-26 ENCOUNTER — TELEPHONE (OUTPATIENT)
Dept: OBGYN | Age: 31
End: 2021-07-26

## 2021-07-26 ENCOUNTER — HOSPITAL ENCOUNTER (OUTPATIENT)
Age: 31
Discharge: HOME | End: 2021-07-26
Payer: COMMERCIAL

## 2021-07-26 DIAGNOSIS — O03.4 INCOMPLETE ABORTION: Primary | ICD-10-CM

## 2021-07-26 DIAGNOSIS — O03.4 INCOMPLETE ABORTION: ICD-10-CM

## 2021-07-26 LAB — HCG QUANTITATIVE: 77 IU/L

## 2021-07-26 PROCEDURE — 84702 CHORIONIC GONADOTROPIN TEST: CPT

## 2021-07-26 PROCEDURE — 36415 COLL VENOUS BLD VENIPUNCTURE: CPT

## 2021-08-03 ENCOUNTER — HOSPITAL ENCOUNTER (OUTPATIENT)
Age: 31
Discharge: HOME | End: 2021-08-03
Payer: COMMERCIAL

## 2021-08-03 DIAGNOSIS — O03.4 INCOMPLETE ABORTION: ICD-10-CM

## 2021-08-03 LAB — HCG QUANTITATIVE: 22 IU/L

## 2021-08-03 PROCEDURE — 84702 CHORIONIC GONADOTROPIN TEST: CPT

## 2021-08-03 PROCEDURE — 36415 COLL VENOUS BLD VENIPUNCTURE: CPT

## 2021-08-04 DIAGNOSIS — O03.9 SAB (SPONTANEOUS ABORTION): Primary | ICD-10-CM

## 2021-08-05 LAB
AVERAGE GLUCOSE: 93
CHOLESTEROL, TOTAL: 176 MG/DL
CHOLESTEROL, TOTAL: 176 MG/DL
CHOLESTEROL/HDL RATIO: 2.75
CHOLESTEROL/HDL RATIO: 2.75
HBA1C MFR BLD: NORMAL %
HDLC SERPL-MCNC: 64 MG/DL (ref 35–70)
HDLC SERPL-MCNC: 64 MG/DL (ref 35–70)
LDL CHOLESTEROL CALCULATED: 103 MG/DL (ref 0–160)
LDL CHOLESTEROL CALCULATED: 103 MG/DL (ref 0–160)
NONHDLC SERPL-MCNC: NORMAL MG/DL
NONHDLC SERPL-MCNC: NORMAL MG/DL
TRIGL SERPL-MCNC: 43 MG/DL
TRIGL SERPL-MCNC: 43 MG/DL
VLDLC SERPL CALC-MCNC: 9 MG/DL
VLDLC SERPL CALC-MCNC: 9 MG/DL

## 2021-08-17 ENCOUNTER — HOSPITAL ENCOUNTER (OUTPATIENT)
Age: 31
Discharge: HOME | End: 2021-08-17
Payer: COMMERCIAL

## 2021-08-17 DIAGNOSIS — O03.9 SAB (SPONTANEOUS ABORTION): ICD-10-CM

## 2021-08-17 LAB — HCG QUANTITATIVE: 4 IU/L

## 2021-08-17 PROCEDURE — 36415 COLL VENOUS BLD VENIPUNCTURE: CPT

## 2021-08-17 PROCEDURE — 84702 CHORIONIC GONADOTROPIN TEST: CPT

## 2021-10-19 ENCOUNTER — OFFICE VISIT (OUTPATIENT)
Dept: FAMILY MEDICINE CLINIC | Age: 31
End: 2021-10-19
Payer: COMMERCIAL

## 2021-10-19 ENCOUNTER — HOSPITAL ENCOUNTER (OUTPATIENT)
Dept: LAB | Age: 31
Setting detail: SPECIMEN
Discharge: HOME | End: 2021-10-19
Payer: COMMERCIAL

## 2021-10-19 VITALS
OXYGEN SATURATION: 97 % | WEIGHT: 140 LBS | BODY MASS INDEX: 24.8 KG/M2 | RESPIRATION RATE: 14 BRPM | HEART RATE: 100 BPM | SYSTOLIC BLOOD PRESSURE: 110 MMHG | TEMPERATURE: 98 F | DIASTOLIC BLOOD PRESSURE: 65 MMHG | HEIGHT: 63 IN

## 2021-10-19 DIAGNOSIS — R50.9 FEVER, UNSPECIFIED FEVER CAUSE: ICD-10-CM

## 2021-10-19 DIAGNOSIS — R68.83 CHILLS: ICD-10-CM

## 2021-10-19 DIAGNOSIS — R05.9 COUGH: ICD-10-CM

## 2021-10-19 DIAGNOSIS — R05.9 COUGH: Primary | ICD-10-CM

## 2021-10-19 LAB
INFLUENZA A ANTIBODY: NORMAL
INFLUENZA B ANTIBODY: NORMAL
S PYO AG THROAT QL: NORMAL

## 2021-10-19 PROCEDURE — C9803 HOPD COVID-19 SPEC COLLECT: HCPCS

## 2021-10-19 PROCEDURE — U0003 INFECTIOUS AGENT DETECTION BY NUCLEIC ACID (DNA OR RNA); SEVERE ACUTE RESPIRATORY SYNDROME CORONAVIRUS 2 (SARS-COV-2) (CORONAVIRUS DISEASE [COVID-19]), AMPLIFIED PROBE TECHNIQUE, MAKING USE OF HIGH THROUGHPUT TECHNOLOGIES AS DESCRIBED BY CMS-2020-01-R: HCPCS

## 2021-10-19 PROCEDURE — 87804 INFLUENZA ASSAY W/OPTIC: CPT | Performed by: NURSE PRACTITIONER

## 2021-10-19 PROCEDURE — U0005 INFEC AGEN DETEC AMPLI PROBE: HCPCS

## 2021-10-19 PROCEDURE — 87880 STREP A ASSAY W/OPTIC: CPT | Performed by: NURSE PRACTITIONER

## 2021-10-19 PROCEDURE — 99213 OFFICE O/P EST LOW 20 MIN: CPT | Performed by: NURSE PRACTITIONER

## 2021-10-19 ASSESSMENT — PATIENT HEALTH QUESTIONNAIRE - PHQ9
SUM OF ALL RESPONSES TO PHQ9 QUESTIONS 1 & 2: 0
SUM OF ALL RESPONSES TO PHQ QUESTIONS 1-9: 0
2. FEELING DOWN, DEPRESSED OR HOPELESS: 0
SUM OF ALL RESPONSES TO PHQ QUESTIONS 1-9: 0
1. LITTLE INTEREST OR PLEASURE IN DOING THINGS: 0
SUM OF ALL RESPONSES TO PHQ QUESTIONS 1-9: 0

## 2021-10-19 ASSESSMENT — ENCOUNTER SYMPTOMS
NAUSEA: 1
SORE THROAT: 1
DIARRHEA: 0
WHEEZING: 0
COUGH: 0
RHINORRHEA: 0
VOMITING: 0
SHORTNESS OF BREATH: 0
ABDOMINAL PAIN: 0

## 2021-10-19 NOTE — PATIENT INSTRUCTIONS
SURVEY:    You may be receiving a survey from [employer] regarding your visit today. Please complete the survey to enable us to provide the highest quality of care to you and your family. If you cannot score us a very good on any question, please call the office to discuss how we could of made your experience a very good one. Thank you.

## 2021-10-19 NOTE — PROGRESS NOTES
Name: Joaquim Elizalde  : 1990         Chief Complaint:     Chief Complaint   Patient presents with    Fever     102.8 last night. took ibuprofen.  Pharyngitis     sore throat started yesterday    Generalized Body Aches     body aches.  Nausea     started yesterday     Headache       History of Present Illness:      Joaquim Elizalde is a 33 y.o.  female who presents with Fever (102.8 last night. took ibuprofen. ), Pharyngitis (sore throat started yesterday), Generalized Body Aches (body aches. ), Nausea (started yesterday ), and Headache      HPI  The patient presents with chills, fever, sore throat, body aches, headache, and nausea that began yesterday. She admits a fever of 102.8 last night. She took 3 ibuprofen and zofran and this was helpful. She admits that her children have been sick with similar symptoms but without fever. She denies known covid exposures. She is vaccinated for covid. She denies loss of taste or smell. She has not had the flu vaccine this year but is scheduled 21. Past Medical History:     Past Medical History:   Diagnosis Date    Anemia during pregnancy in second trimester 2019    Breast disorder     [facility] Billing infection 2014    Exacerbation of rad (reactive airway disease), mild intermittent 3/1/2019      Reviewed all health maintenance requirements and ordered appropriate tests  Health Maintenance Due   Topic Date Due    Varicella vaccine (1 of 2 - 2-dose childhood series) Never done    Pneumococcal 0-64 years Vaccine (1 of 2 - PPSV23) Never done    DTaP/Tdap/Td vaccine (1 - Tdap) Never done    Flu vaccine (1) 2021       Past Surgical History:     Past Surgical History:   Procedure Laterality Date    DILATION AND CURETTAGE      DILATION AND CURETTAGE OF UTERUS  13    missed         Medications:       Prior to Admission medications    Medication Sig Start Date End Date Taking?  Authorizing Provider   ALPRAZolam Yuki Moreau) 0.5 MG tablet  1/1/21  Yes Historical Provider, MD   sertraline (ZOLOFT) 25 MG tablet Take 1 tablet by mouth daily  Patient not taking: Reported on 11/2/2020 8/5/19   Sina Santos, APRN - CNM        Allergies:       Francetta Mungo nut oil] and Bonifacio flavor    Social History:     Tobacco:    reports that she has never smoked. She has never used smokeless tobacco.  Alcohol:      reports current alcohol use of about 3.0 standard drinks of alcohol per week. Drug Use:  reports no history of drug use. Family History:     Family History   Problem Relation Age of Onset    No Known Problems Brother     No Known Problems Mother     Cancer Father         skin    No Known Problems Maternal Grandmother     No Known Problems Maternal Grandfather     No Known Problems Paternal Grandmother     No Known Problems Paternal Grandfather     Other Brother         internal strep infection causing organ failure, age 15    No Known Problems Sister        Review of Systems:     Positive and Negative as described in HPI    Review of Systems   Constitutional: Positive for chills and fever. HENT: Positive for ear pain (Intermittent right ear pain, sharp) and sore throat. Negative for congestion and rhinorrhea. Respiratory: Negative for cough, shortness of breath and wheezing. Cardiovascular: Negative for chest pain and palpitations. Gastrointestinal: Positive for nausea. Negative for abdominal pain, diarrhea and vomiting. Musculoskeletal: Positive for myalgias. Skin: Negative for rash. Neurological: Positive for headaches. Physical Exam:   Vitals:  /65   Pulse 100   Temp 98 °F (36.7 °C)   Resp 14   Ht 5' 3\" (1.6 m)   Wt 140 lb (63.5 kg)   SpO2 97%   BMI 24.80 kg/m²     Physical Exam  Constitutional:       General: She is not in acute distress. Appearance: Normal appearance. She is normal weight. She is ill-appearing. She is not toxic-appearing. HENT:      Head: Normocephalic.       Right Ear: Tympanic membrane, ear canal and external ear normal.      Left Ear: External ear normal. There is impacted cerumen. Nose: Nose normal. No congestion or rhinorrhea. Mouth/Throat:      Mouth: Mucous membranes are moist.      Pharynx: Posterior oropharyngeal erythema present. Comments: No PND  Eyes:      Conjunctiva/sclera: Conjunctivae normal.   Cardiovascular:      Rate and Rhythm: Normal rate and regular rhythm. Heart sounds: Normal heart sounds. No murmur heard. Pulmonary:      Effort: Pulmonary effort is normal. No respiratory distress. Breath sounds: Normal breath sounds. No stridor. No wheezing, rhonchi or rales. Musculoskeletal:      Cervical back: Neck supple. Lymphadenopathy:      Cervical: No cervical adenopathy. Skin:     General: Skin is warm and moist.   Neurological:      Mental Status: She is alert and oriented to person, place, and time. Psychiatric:         Mood and Affect: Mood normal.         Behavior: Behavior normal.         Thought Content: Thought content normal.         Judgment: Judgment normal.         Data:     Lab Results   Component Value Date     08/25/2020    K 4.4 08/25/2020     08/25/2020    CO2 24 08/25/2020    BUN 15 08/25/2020    CREATININE 0.60 08/25/2020    GLUCOSE 91 08/25/2020    PROT 6.8 06/03/2019    LABALBU 3.6 06/03/2019    BILITOT 0.59 06/03/2019    ALKPHOS 96 06/03/2019    AST 14 08/25/2020    ALT 13 08/25/2020     Lab Results   Component Value Date    WBC 6.3 07/21/2021    RBC 3.59 07/21/2021    HGB 11.2 07/21/2021    HCT 35.0 07/21/2021    MCV 97.5 07/21/2021    MCH 31.2 07/21/2021    MCHC 32.0 07/21/2021    RDW 11.9 07/21/2021     07/21/2021    MPV 10.5 07/21/2021     Lab Results   Component Value Date    TSH 1.08 06/02/2014     Lab Results   Component Value Date    CHOL 176 08/05/2021    HDL 64 08/05/2021       Assessment/Plan:      Diagnosis Orders   1.  Cough  COVID-19    POCT rapid strep A    POCT Influenza A/B   2. Fever, unspecified fever cause  COVID-19    POCT rapid strep A    POCT Influenza A/B   3. Chills  COVID-19    POCT rapid strep A    POCT Influenza A/B     -Afebrile in office. Vitals stable. -Rapid influenza in office negative  -Rapid strep in office negative  -COVID-19 test ordered. Encouraged patient to quarantine until results  -Recommended rest, hydration, ibuprofen and Tylenol as needed, salt water gargles, humidifier use, handwashing, and surface cleansing  -Continue Zofran as needed. She confirms she has this at home  -Will call patient with results and update treatment plan as appropriate    Completed Refills   Requested Prescriptions      No prescriptions requested or ordered in this encounter       Orders Placed This Encounter   Procedures    COVID-19     Standing Status:   Future     Number of Occurrences:   1     Standing Expiration Date:   10/19/2022     Scheduling Instructions:      1) Due to current limited availability of the COVID-19 test, tests will be prioritized based on responses to questions above. Testing may be delayed due to volume. 2) Print and instruct patient to adhere to CDC home isolation program. (Link Above)              3) Set up or refer patient for a monitoring program.              4) Have patient sign up for and leverage MyChart (if not previously done). Order Specific Question:   Is this test for diagnosis or screening? Answer:   Diagnosis of ill patient     Order Specific Question:   Symptomatic for COVID-19 as defined by CDC? Answer:   Yes     Order Specific Question:   Date of Symptom Onset     Answer:   10/18/2021     Order Specific Question:   Hospitalized for COVID-19? Answer:   No     Order Specific Question:   Admitted to ICU for COVID-19? Answer:   No     Order Specific Question:   Employed in healthcare setting? Answer:   Yes     Order Specific Question:   Resident in a congregate (group) care setting?      Answer: No     Order Specific Question:   Pregnant? Answer:   No     Order Specific Question:   Previously tested for COVID-19? Answer: Yes    POCT rapid strep A    POCT Influenza A/B        Results for POC orders placed in visit on 10/19/21   POCT Influenza A/B   Result Value Ref Range    Influenza A Ab neg     Influenza B Ab neg    POCT rapid strep A   Result Value Ref Range    Strep A Ag None Detected None Detected       Return if symptoms worsen or fail to improve.     Electronically signed by JENNI Cadet CNP on 10/19/21 at 11:25 AM.

## 2021-10-20 LAB
SARS-COV-2: NORMAL
SARS-COV-2: NOT DETECTED
SOURCE: NORMAL

## 2021-11-02 ENCOUNTER — OFFICE VISIT (OUTPATIENT)
Dept: FAMILY MEDICINE CLINIC | Age: 31
End: 2021-11-02
Payer: COMMERCIAL

## 2021-11-02 VITALS
SYSTOLIC BLOOD PRESSURE: 116 MMHG | DIASTOLIC BLOOD PRESSURE: 74 MMHG | HEIGHT: 63 IN | WEIGHT: 145 LBS | BODY MASS INDEX: 25.69 KG/M2 | OXYGEN SATURATION: 98 %

## 2021-11-02 DIAGNOSIS — Z87.01 HISTORY OF PNEUMONIA: ICD-10-CM

## 2021-11-02 DIAGNOSIS — H61.22 CERUMEN DEBRIS ON TYMPANIC MEMBRANE OF LEFT EAR: ICD-10-CM

## 2021-11-02 DIAGNOSIS — Z23 FLU VACCINE NEED: Primary | ICD-10-CM

## 2021-11-02 PROCEDURE — 90471 IMMUNIZATION ADMIN: CPT | Performed by: FAMILY MEDICINE

## 2021-11-02 PROCEDURE — 90674 CCIIV4 VAC NO PRSV 0.5 ML IM: CPT | Performed by: FAMILY MEDICINE

## 2021-11-02 PROCEDURE — 99395 PREV VISIT EST AGE 18-39: CPT | Performed by: FAMILY MEDICINE

## 2021-11-02 NOTE — PATIENT INSTRUCTIONS
SURVEY:    You may be receiving a survey from [employer] regarding your visit today. Please complete the survey to enable us to provide the highest quality of care to you and your family. If you cannot score us a very good on any question, please call the office to discuss how we could of made your experience a very good one. Thank you.

## 2021-11-02 NOTE — PROGRESS NOTES
Michelle KAUFMAN CMA, am scribing for and in the presence of Dr. Mehran Briscoe. 8:25/[facility]    [address] [address]  [phone]  Dept: [phone]    Teto Munoz is a 33 y.o. female here for Annual Exam      HPI:  Pt presents for annual wellness   No concerns   -needs flu vac today and biometric form signed   Prior to Admission medications    Medication Sig Start Date End Date Taking? Authorizing Provider   ALPRAZolam Cindia Muss) 0.5 MG tablet  21  Yes Historical Provider, MD       ROS:  General Constitutional: Denies chills. Denies fever. Denies headache. Denies lightheadedness. Ophthalmologic: Denies blurred vision. ENT: Denies nasal congestion. Denies sore throat. Denies ear pain and pressure. Respiratory: Denies cough. Denies shortness of breath. Denies wheezing. Cardiovascular: Denies chest pain at rest. Denies irregular heartbeat. Denies palpitations. Gastrointestinal: Denies abdominal pain. Denies blood in the stool. Denies constipation. Denies diarrhea. Denies nausea. Denies vomiting. Genitourinary: Denies blood in the urine. Denies difficulty urinating. Denies frequent urination. Denies painful urination. Denies urinary incontinence. Musculoskeletal: Denies muscle aches. Denies painful joints. Denies swollen joints. Peripheral Vascular: Denies pain/cramping in legs after exertion. Skin: Denies dry skin. Denies itching. Denies rash. Neurologic: Denies falls. Denies dizziness. Denies fainting. Denies tingling/numbness. Psychiatric: Denies sleep disturbance. Denies anxiety. Denies depressed mood.     Past Surgical History:   Procedure Laterality Date    DILATION AND CURETTAGE      DILATION AND CURETTAGE OF UTERUS  13    missed        Family History   Problem Relation Age of Onset    No Known Problems Brother     No Known Problems Mother     Cancer Father         skin    No Known Problems Maternal Grandmother     No Known Problems Maternal Grandfather     No Known Problems Paternal Grandmother     No Known Problems Paternal Grandfather     Other Brother         internal strep infection causing organ failure, age 15    No Known Problems Sister        Past Medical History:   Diagnosis Date    Anemia during pregnancy in second trimester 2/23/2019    Breast disorder     Melven Baas infection 2014    Exacerbation of rad (reactive airway disease), mild intermittent 3/1/2019      Social History     Tobacco Use    Smoking status: Never Smoker    Smokeless tobacco: Never Used   Substance Use Topics    Alcohol use: Yes     Alcohol/week: 3.0 standard drinks     Types: 3 Glasses of wine per week      Current Outpatient Medications   Medication Sig Dispense Refill    ALPRAZolam (XANAX) 0.5 MG tablet        No current facility-administered medications for this visit. Allergies   Allergen Reactions    Cashews [Macadamia Nut Oil]     Bonifacio Flavor        PHYSICAL EXAM:    /74   Ht 5' 3\" (1.6 m)   Wt 145 lb (65.8 kg) Comment: wearing boots  SpO2 98%   BMI 25.69 kg/m²   Wt Readings from Last 3 Encounters:   11/02/21 145 lb (65.8 kg)   10/19/21 140 lb (63.5 kg)   07/23/21 141 lb (64 kg)     BP Readings from Last 3 Encounters:   11/02/21 116/74   10/19/21 110/65   07/23/21 120/78       General Appearance: in no acute distress, well developed, well nourished. Eyes: pupils equal, round reactive to light and accommodation. Ears: normal canal and TM's. L ear cerumen  Nose: nares patent, no lesions. Oral Cavity: mucosa moist.  Throat: clear. Neck/Thyroid: neck supple, full range of motion, no cervical lymphadenopathy, no thyromegaly or carotid bruits. Skin: warm and dry. No suspicious lesions. Heart: regular rate and rhythm. No murmurs. S1, S2 normal, no gallops. Rate 80  Lungs: clear to auscultation bilaterally. Abdomen: bowel sounds present, soft, nontender, nondistended, no masses or organomegaly.   Musculoskeletal:

## 2021-12-16 ENCOUNTER — OFFICE VISIT (OUTPATIENT)
Dept: OBGYN | Age: 31
End: 2021-12-16
Payer: COMMERCIAL

## 2021-12-16 VITALS
WEIGHT: 147 LBS | DIASTOLIC BLOOD PRESSURE: 74 MMHG | SYSTOLIC BLOOD PRESSURE: 120 MMHG | HEIGHT: 63 IN | BODY MASS INDEX: 26.05 KG/M2

## 2021-12-16 DIAGNOSIS — N90.89 VULVAR IRRITATION: ICD-10-CM

## 2021-12-16 DIAGNOSIS — K60.2 ANAL FISSURE: ICD-10-CM

## 2021-12-16 DIAGNOSIS — Z01.419 ENCOUNTER FOR ANNUAL ROUTINE GYNECOLOGICAL EXAMINATION: Primary | ICD-10-CM

## 2021-12-16 PROCEDURE — 99395 PREV VISIT EST AGE 18-39: CPT | Performed by: ADVANCED PRACTICE MIDWIFE

## 2021-12-16 RX ORDER — CLOBETASOL PROPIONATE 0.5 MG/G
OINTMENT TOPICAL
Qty: 15 G | Refills: 1 | Status: SHIPPED | OUTPATIENT
Start: 2021-12-16 | End: 2022-01-12

## 2021-12-16 RX ORDER — HYDROCORTISONE ACETATE PRAMOXINE HCL 2.5; 1 G/100G; G/100G
CREAM TOPICAL 2 TIMES DAILY
Qty: 30 G | Refills: 1 | Status: SHIPPED | OUTPATIENT
Start: 2021-12-16 | End: 2022-01-12

## 2021-12-16 ASSESSMENT — ENCOUNTER SYMPTOMS
BACK PAIN: 0
ABDOMINAL PAIN: 0
SHORTNESS OF BREATH: 0

## 2021-12-16 NOTE — PROGRESS NOTES
YEARLY PHYSICAL    Date of service: 2021    Chadd Ramos  Is a 33 y.o.   female    PT's PCP is: Aileen Gibson MD     : 1990                                             Subjective:       Patient's last menstrual period was 2021 (approximate).      Are your menses regular: yes    OB History    Para Term  AB Living   5 3 3   2 3   SAB IAB Ectopic Molar Multiple Live Births   2       0 3      # Outcome Date GA Lbr Robin/2nd Weight Sex Delivery Anes PTL Lv   5 SAB 21 10w0d    SAB   FD   4 Term 19 39w0d 08:18 / 00:09 8 lb 8.8 oz (3.878 kg) M Vag-Spont None N VIOLETA   3 Term 14 39w5d 07:01 8 lb 6.1 oz (3.802 kg) F Vag-Spont EPI  VIOLETA   2 2013 9w0d             Birth Comments: 13 week D&C   1 Term 11 41w0d  9 lb 4 oz (4.196 kg) M Vag-Spont EPI N VIOLETA        Social History     Tobacco Use   Smoking Status Never Smoker   Smokeless Tobacco Never Used        Social History     Substance and Sexual Activity   Alcohol Use Yes    Alcohol/week: 3.0 standard drinks    Types: 3 Glasses of wine per week    Comment: social       Family History   Problem Relation Age of Onset    No Known Problems Mother     Cancer Father         skin    No Known Problems Sister     No Known Problems Brother     Other Brother         internal strep infection causing organ failure, age 15    No Known Problems Maternal Grandmother     No Known Problems Maternal Grandfather     No Known Problems Paternal Grandmother     No Known Problems Paternal Grandfather        Any family history of breast or ovarian cancer: No    Any family history of blood clots: No    Have you had a positive covid test: No    Have you had the covid immunization: Yes      Allergies: Cashews [macadamia nut oil] and Bonifacio flavor      Current Outpatient Medications:     Hydrocort-Pramoxine, Perianal, (ANALPRAM HC) 2.5-1 % rectal cream, Place rectally 2 times daily, Disp: 30 g, Rfl: 1    clobetasol (TEMOVATE) 0.05 % ointment, Apply topically 2 times daily prn vulvar irritation for sporadic use, Disp: 15 g, Rfl: 1    ALPRAZolam (XANAX) 0.5 MG tablet, , Disp: , Rfl:     Social History     Substance and Sexual Activity   Sexual Activity Yes    Partners: Male    Birth control/protection: Male Sterilization       Any bleeding or pain with intercourse: No    Last Yearly:  2020    Last pap: 2020    Last HPV: 2020    Last Mammogram: never    Last Isa Alert never    Last colorectal screen- type:never*  date  never    Do you do self breast exams: Yes    Past Medical History:   Diagnosis Date    Anemia during pregnancy in second trimester 2019    Breast disorder     [place] infection     Exacerbation of rad (reactive airway disease), mild intermittent 3/1/2019       Past Surgical History:   Procedure Laterality Date    DILATION AND CURETTAGE      DILATION AND CURETTAGE OF UTERUS  13    missed        Family History   Problem Relation Age of Onset    No Known Problems Mother     Cancer Father         skin    No Known Problems Sister     No Known Problems Brother     Other Brother         internal strep infection causing organ failure, age 15   [employer] No Known Problems Maternal Grandmother     No Known Problems Maternal Grandfather     No Known Problems Paternal Grandmother     No Known Problems Paternal Grandfather        Chief Complaint   Patient presents with    Gynecologic Exam     Yearly exam. Last pap 12/15/2020 megative, HPV not detected. Would like refill of clobetasol for prn use. C/O hemmorrhoids           PE:  Vital Signs  Blood pressure 120/74, height 5' 3\" (1.6 m), weight 147 lb (66.7 kg), last menstrual period 2021, currently breastfeeding. Estimated body mass index is 26.04 kg/m² as calculated from the following:    Height as of this encounter: 5' 3\" (1.6 m).     Weight as of this encounter: 147 lb (66.7 kg). Labs:    No results found for this visit on 12/16/21. No data recorded    NURSE: Saurav ALONZO    HPI: here for annual exam, also c/o possible hemorrhoid with intermittant painful defecation and streaks of blood on stool \"never in toilet water\", has used topical steroid intermittantly for vulvar irritation in past and desires continuation of that    Review of Systems   Constitutional: Negative. HENT: Negative for congestion. Respiratory: Negative for shortness of breath. Cardiovascular: Negative for chest pain. Gastrointestinal: Negative for abdominal pain. Genitourinary: Negative for dysuria. Musculoskeletal: Negative for back pain. Skin: Negative for rash. Neurological: Negative for headaches. Psychiatric/Behavioral: The patient is not nervous/anxious. Objective  Lymphatic:   no lymphadenopathy  Heent:   negative   Cor: regular rate and rhythm, no murmurs              Pul:clear to auscultation bilaterally- no wheezes, rales or rhonchi, normal air movement, no respiratory distress      GI: Abdomen soft, non-tender. BS normal. No masses,  No organomegaly           Extremities: normal strength, tone, and muscle mass   Breasts: Breast:normal appearance, no masses or tenderness   Pelvic Exam: GENITAL/URINARY:  External Genitalia:  General appearance; normal, Hair distribution; normal, Lesions absent  Urethral Meatus:  Size normal, Location normal, Lesions absent, Prolapse absent  Urethra: Fullness absent, Masses absent  Bladder:  Fullness absent, Masses absent, Tenderness absent, Cystocele absent  Vagina:  General appearance normal, Estrogen effect normal, Discharge absent, Lesions absent, Pelvic support normal  Cervix:  General appearance normal, Lesions absent, Discharge absent, Tenderness absent, Enlargement absent, Nodularity absent  Uterus:  Size normal, Tenderness absent  Adenexa:   Masses absent, Tenderness absent  Anus/Perineum:  Masses absent and fissure at 11 oclock, hemorrhoidal tag at 9 oclock                                                              Assessment and Plan          Diagnosis Orders   1. Encounter for annual routine gynecological examination     2. Anal fissure  Hydrocort-Pramoxine, Perianal, (ANALPRAM HC) 2.5-1 % rectal cream   3. Vulvar irritation  clobetasol (TEMOVATE) 0.05 % ointment       if fissure problem persists to call office for referral to colorectal surgeon      I am having Tegan Beebe start on Hydrocort-Pramoxine (Perianal) and clobetasol. I am also having her maintain her ALPRAZolam.    Return in about 1 year (around 12/16/2022) for yearly. She was also counseled on her preventative health maintenance recommendations and follow-up. There are no Patient Instructions on file for this visit.     JENNI Ayala CNM,12/16/2021 10:17 PM

## 2021-12-28 ENCOUNTER — HOSPITAL ENCOUNTER (OUTPATIENT)
Dept: LAB | Age: 31
Setting detail: SPECIMEN
Discharge: HOME | End: 2021-12-28
Payer: COMMERCIAL

## 2021-12-28 ENCOUNTER — HOSPITAL ENCOUNTER (OUTPATIENT)
Age: 31
Discharge: HOME | End: 2021-12-30
Payer: COMMERCIAL

## 2021-12-28 ENCOUNTER — OFFICE VISIT (OUTPATIENT)
Dept: FAMILY MEDICINE CLINIC | Age: 31
End: 2021-12-28
Payer: COMMERCIAL

## 2021-12-28 ENCOUNTER — HOSPITAL ENCOUNTER (OUTPATIENT)
Dept: GENERAL RADIOLOGY | Age: 31
Discharge: HOME | End: 2021-12-30
Payer: COMMERCIAL

## 2021-12-28 VITALS
OXYGEN SATURATION: 98 % | DIASTOLIC BLOOD PRESSURE: 60 MMHG | HEIGHT: 63 IN | TEMPERATURE: 99.1 F | SYSTOLIC BLOOD PRESSURE: 94 MMHG | WEIGHT: 151 LBS | BODY MASS INDEX: 26.75 KG/M2 | HEART RATE: 104 BPM

## 2021-12-28 DIAGNOSIS — R05.9 COUGH: Primary | ICD-10-CM

## 2021-12-28 DIAGNOSIS — R05.9 COUGH: ICD-10-CM

## 2021-12-28 DIAGNOSIS — J02.9 ACUTE VIRAL PHARYNGITIS: ICD-10-CM

## 2021-12-28 DIAGNOSIS — Z20.822 COVID-19 RULED OUT: ICD-10-CM

## 2021-12-28 LAB
INFLUENZA A ANTIBODY: NEGATIVE
INFLUENZA B ANTIBODY: NEGATIVE
S PYO AG THROAT QL: NORMAL

## 2021-12-28 PROCEDURE — 87804 INFLUENZA ASSAY W/OPTIC: CPT | Performed by: NURSE PRACTITIONER

## 2021-12-28 PROCEDURE — 87880 STREP A ASSAY W/OPTIC: CPT | Performed by: NURSE PRACTITIONER

## 2021-12-28 PROCEDURE — U0005 INFEC AGEN DETEC AMPLI PROBE: HCPCS

## 2021-12-28 PROCEDURE — 99214 OFFICE O/P EST MOD 30 MIN: CPT | Performed by: NURSE PRACTITIONER

## 2021-12-28 PROCEDURE — U0003 INFECTIOUS AGENT DETECTION BY NUCLEIC ACID (DNA OR RNA); SEVERE ACUTE RESPIRATORY SYNDROME CORONAVIRUS 2 (SARS-COV-2) (CORONAVIRUS DISEASE [COVID-19]), AMPLIFIED PROBE TECHNIQUE, MAKING USE OF HIGH THROUGHPUT TECHNOLOGIES AS DESCRIBED BY CMS-2020-01-R: HCPCS

## 2021-12-28 PROCEDURE — 71046 X-RAY EXAM CHEST 2 VIEWS: CPT

## 2021-12-28 PROCEDURE — C9803 HOPD COVID-19 SPEC COLLECT: HCPCS

## 2021-12-28 ASSESSMENT — ENCOUNTER SYMPTOMS
COUGH: 1
SHORTNESS OF BREATH: 1
SORE THROAT: 1

## 2021-12-28 NOTE — PROGRESS NOTES
Name: Merlyn Brown  : 1990         Chief Complaint:     Chief Complaint   Patient presents with    Pharyngitis     started  scratchy, went to urgent care and was told if she has a cough it wasn't strep and told to assume she was covid positive. she took a covid test yesterday and it was negative. breathing in air feels like \"spikes\" her kids are sick also, their strep test was negative yesterday taking ibuprofen and tylenol    Headache     frontal    Cough     slightly productive first thing in the morning.  Sinus Problem     sinus drainage    Fever     low grade  started     Chills    Shortness of Breath     when talking a lot but not wheezing    Other     ears feel itchy, denies pressure or pain       History of Present Illness:      Merlyn Brown is a 33 y.o.  female who presents with Pharyngitis (started  scratchy, went to urgent care and was told if she has a cough it wasn't strep and told to assume she was covid positive. she took a covid test yesterday and it was negative. breathing in air feels like \"spikes\" her kids are sick also, their strep test was negative yesterday taking ibuprofen and tylenol), Headache (frontal), Cough (slightly productive first thing in the morning. ), Sinus Problem (sinus drainage), Fever (low grade  started ), Chills, Shortness of Breath (when talking a lot but not wheezing), and Other (ears feel itchy, denies pressure or pain)      HPI  The patient presents with complaints of chills, fever, sore throat, headache, sinus drainage, SOB, and ear itching bilaterally that began 21. She states that her worst symptom is her throat pain. She has been taking ibuprofen, tylenol, vitamin C, and throat spray. Sick contacts include kids with similar symptoms. She was seen at urgent care yesterday. She admits having a negative covid test yesterday at urgent care. Her temperature has been averaging  with tylenol/ibuprofen. She admits SOB with talking. She admits having pneumonia 2 years ago and is anxious about this. She is vaccinated for covid. She is vaccinated for annual flu. Denies loss of taste or smell. Past Medical History:     Past Medical History:   Diagnosis Date    Anemia during pregnancy in second trimester 2019    Breast disorder     Michial Pain infection     Exacerbation of rad (reactive airway disease), mild intermittent 3/1/2019      Reviewed all health maintenance requirements and ordered appropriate tests  Health Maintenance Due   Topic Date Due    COVID-19 Vaccine (2 - Booster for [employer] series) 10/11/2021       Past Surgical History:     Past Surgical History:   Procedure Laterality Date    DILATION AND CURETTAGE      DILATION AND CURETTAGE OF UTERUS  13    missed         Medications:       Prior to Admission medications    Medication Sig Start Date End Date Taking? Authorizing Provider   Hydrocort-Pramoxine, Perianal, (ANALPRAM HC) 2.5-1 % rectal cream Place rectally 2 times daily  Patient not taking: Reported on 2021   JENNI Xie CNM   clobetasol (TEMOVATE) 0.05 % ointment Apply topically 2 times daily prn vulvar irritation for sporadic use  Patient not taking: Reported on 2021   JENNI Xie CNM   ALPRAZolam Suargelianne Martha) 0.5 MG tablet  21   Historical Provider, MD        Allergies:       Cashews [macadamia nut oil] and [place] flavor    Social History:     Tobacco:    reports that she has never smoked. She has never used smokeless tobacco.  Alcohol:      reports current alcohol use of about 3.0 standard drinks of alcohol per week. Drug Use:  reports no history of drug use.     Family History:     Family History   Problem Relation Age of Onset    No Known Problems Mother     Cancer Father         skin    No Known Problems Sister     No Known Problems Brother     Other Brother         internal strep infection causing organ failure, age 15    No Known Problems Maternal Grandmother     No Known Problems Maternal Grandfather     No Known Problems Paternal Grandmother     No Known Problems Paternal Grandfather        Review of Systems:     Positive and Negative as described in HPI    Review of Systems   Constitutional: Positive for chills and fever. HENT: Positive for sore throat. Respiratory: Positive for cough and shortness of breath. Neurological: Positive for headaches. Physical Exam:   Vitals:  BP 94/60   Pulse 104   Temp 99.1 °F (37.3 °C)   Ht 5' 3\" (1.6 m)   Wt 151 lb (68.5 kg)   LMP 12/09/2021 (Approximate)   SpO2 98%    L/min Comment: 370  BMI 26.75 kg/m²     Physical Exam  Constitutional:       Appearance: Normal appearance. She is ill-appearing. HENT:      Head: Normocephalic. Right Ear: External ear normal. There is impacted cerumen. Left Ear: External ear normal. There is impacted cerumen. Nose: Congestion present. Mouth/Throat:      Mouth: Mucous membranes are moist.      Pharynx: Posterior oropharyngeal erythema present. No oropharyngeal exudate. Comments: +PND  Cardiovascular:      Rate and Rhythm: Normal rate and regular rhythm. Heart sounds: Normal heart sounds. No murmur heard. Pulmonary:      Effort: Pulmonary effort is normal. No respiratory distress. Breath sounds: Normal breath sounds. No stridor. No wheezing, rhonchi or rales. Musculoskeletal:      Cervical back: Neck supple. Lymphadenopathy:      Cervical: No cervical adenopathy. Skin:     General: Skin is warm. Neurological:      Mental Status: She is alert and oriented to person, place, and time. Psychiatric:         Mood and Affect: Mood normal.         Behavior: Behavior normal.         Thought Content:  Thought content normal.         Judgment: Judgment normal.         Data:     Lab Results   Component Value Date     08/25/2020    K 4.4 08/25/2020     08/25/2020    CO2 24 08/25/2020    BUN 15 08/25/2020    CREATININE 0.60 08/25/2020    GLUCOSE 91 08/25/2020    PROT 6.8 06/03/2019    LABALBU 3.6 06/03/2019    BILITOT 0.59 06/03/2019    ALKPHOS 96 06/03/2019    AST 14 08/25/2020    ALT 13 08/25/2020     Lab Results   Component Value Date    WBC 6.3 07/21/2021    RBC 3.59 07/21/2021    HGB 11.2 07/21/2021    HCT 35.0 07/21/2021    MCV 97.5 07/21/2021    MCH 31.2 07/21/2021    MCHC 32.0 07/21/2021    RDW 11.9 07/21/2021     07/21/2021    MPV 10.5 07/21/2021     Lab Results   Component Value Date    TSH 1.08 06/02/2014     Lab Results   Component Value Date    CHOL 176 08/05/2021    CHOL 176 08/05/2021    HDL 64 08/05/2021    HDL 64 08/05/2021       Assessment/Plan:      Diagnosis Orders   1. Cough  XR CHEST STANDARD (2 VW)    COVID-19    POCT Influenza A/B    POCT rapid strep A   2. COVID-19 ruled out  XR CHEST STANDARD (2 VW)    COVID-19   3. Acute viral pharyngitis  POCT rapid strep A     POC influenza and strep completed in office, results negative  Further evaluation with chest x-ray to rule out pneumonia  PCR Covid test ordered today  Encouraged rest and hydration  Schedule bilateral ear irrigation when symptoms resolved. Prep information supplied to patient today.   Reviewed worsening signs and symptoms and when to notify office  Reviewed emergent signs and symptoms and when to present to the emergency department  [facility] call with the above results and update treatment plan as appropriate    Completed Refills   Requested Prescriptions      No prescriptions requested or ordered in this encounter       Orders Placed This Encounter   Procedures    XR CHEST STANDARD (2 VW)     Standing Status:   Future     Number of Occurrences:   1     Standing Expiration Date:   12/28/2022    COVID-19     Standing Status:   Future     Number of Occurrences:   1     Standing Expiration Date:   12/28/2022     Scheduling Instructions:      1) Due to current limited availability of the COVID-19 test, tests will be prioritized based on responses to questions above. Testing may be delayed due to volume. 2) Print and instruct patient to adhere to CDC home isolation program. (Link Above)              3) Set up or refer patient for a monitoring program.              4) Have patient sign up for and leverage MyChart (if not previously done). Order Specific Question:   Is this test for diagnosis or screening? Answer:   Diagnosis of ill patient     Order Specific Question:   Symptomatic for COVID-19 as defined by CDC? Answer:   Yes     Order Specific Question:   Date of Symptom Onset     Answer:   12/25/2021     Order Specific Question:   Hospitalized for COVID-19? Answer:   No     Order Specific Question:   Admitted to ICU for COVID-19? Answer:   No     Order Specific Question:   Employed in healthcare setting? Answer:   Unknown     Order Specific Question:   Resident in a congregate (group) care setting? Answer:   Unknown     Order Specific Question:   Pregnant? Answer:   Unknown     Order Specific Question:   Previously tested for COVID-19? Answer:   Unknown    POCT Influenza A/B    POCT rapid strep A        Results for POC orders placed in visit on 12/28/21   POCT Influenza A/B   Result Value Ref Range    Influenza A Ab negative     Influenza B Ab negative    POCT rapid strep A   Result Value Ref Range    Strep A Ag None Detected None Detected       Return if symptoms worsen or fail to improve.     Electronically signed by JENNI Ortiz CNP on 12/28/21 at 7:22 PM.

## 2021-12-28 NOTE — PATIENT INSTRUCTIONS
SURVEY:    You may be receiving a survey from [employer] regarding your visit today. Please complete the survey to enable us to provide the highest quality of care to you and your family. If you cannot score us a very good on any question, please call the office to discuss how we could have made your experience a very good one. Thank you.

## 2021-12-29 LAB
SARS-COV-2: NORMAL
SARS-COV-2: NOT DETECTED
SOURCE: NORMAL

## 2022-01-12 ENCOUNTER — OFFICE VISIT (OUTPATIENT)
Dept: FAMILY MEDICINE CLINIC | Age: 32
End: 2022-01-12
Payer: COMMERCIAL

## 2022-01-12 VITALS
HEART RATE: 104 BPM | TEMPERATURE: 99.1 F | RESPIRATION RATE: 16 BRPM | WEIGHT: 151 LBS | BODY MASS INDEX: 26.75 KG/M2 | HEIGHT: 63 IN | DIASTOLIC BLOOD PRESSURE: 80 MMHG | SYSTOLIC BLOOD PRESSURE: 118 MMHG | OXYGEN SATURATION: 98 %

## 2022-01-12 DIAGNOSIS — J21.9 ACUTE BRONCHIOLITIS DUE TO UNSPECIFIED ORGANISM: Primary | ICD-10-CM

## 2022-01-12 PROCEDURE — 99213 OFFICE O/P EST LOW 20 MIN: CPT | Performed by: FAMILY MEDICINE

## 2022-01-12 RX ORDER — ALBUTEROL SULFATE 90 UG/1
2 AEROSOL, METERED RESPIRATORY (INHALATION) 4 TIMES DAILY PRN
Qty: 18 G | Refills: 5 | Status: SHIPPED | OUTPATIENT
Start: 2022-01-12

## 2022-01-12 RX ORDER — BROMPHENIRAMINE MALEATE, PSEUDOEPHEDRINE HYDROCHLORIDE, AND DEXTROMETHORPHAN HYDROBROMIDE 2; 30; 10 MG/5ML; MG/5ML; MG/5ML
SYRUP ORAL
COMMUNITY
Start: 2021-12-27 | End: 2022-02-01

## 2022-01-12 RX ORDER — AZITHROMYCIN 250 MG/1
250 TABLET, FILM COATED ORAL DAILY
Qty: 1 PACKET | Refills: 0 | Status: SHIPPED | OUTPATIENT
Start: 2022-01-12 | End: 2022-01-17

## 2022-01-12 RX ORDER — PREDNISONE 20 MG/1
20 TABLET ORAL 2 TIMES DAILY
Qty: 10 TABLET | Refills: 0 | Status: SHIPPED | OUTPATIENT
Start: 2022-01-12 | End: 2022-01-17

## 2022-01-12 SDOH — ECONOMIC STABILITY: FOOD INSECURITY: WITHIN THE PAST 12 MONTHS, THE FOOD YOU BOUGHT JUST DIDN'T LAST AND YOU DIDN'T HAVE MONEY TO GET MORE.: NEVER TRUE

## 2022-01-12 SDOH — ECONOMIC STABILITY: FOOD INSECURITY: WITHIN THE PAST 12 MONTHS, YOU WORRIED THAT YOUR FOOD WOULD RUN OUT BEFORE YOU GOT MONEY TO BUY MORE.: NEVER TRUE

## 2022-01-12 ASSESSMENT — SOCIAL DETERMINANTS OF HEALTH (SDOH): HOW HARD IS IT FOR YOU TO PAY FOR THE VERY BASICS LIKE FOOD, HOUSING, MEDICAL CARE, AND HEATING?: NOT HARD AT ALL

## 2022-01-12 NOTE — PROGRESS NOTES
Tiffanie Marte, GUIDO, am scribing for and in the presence of Dr. Pablo Cruz. [address] [address]  [place] [address]  Dept: [phone]    HPI:had resp illness 2 weeks ago when her children all had covid  She tested neg pcr  But 2 days ago tested pos rapid test when she had fever and cough  She feels weak and fatigues  Not dyspneic at rest  She has responded to cough med though drowsiness side effect  No chest pain   She is very anxious  She had been to work  Over th past 2 weeks till yesterday    Current Outpatient Medications   Medication Sig Dispense Refill    brompheniramine-pseudoephedrine-DM 2-30-10 MG/5ML syrup       azithromycin (ZITHROMAX Z-BEL) 250 MG tablet Take 1 tablet by mouth daily for 5 days As directed per packet instructions. 1 packet 0    predniSONE (DELTASONE) 20 MG tablet Take 1 tablet by mouth 2 times daily for 5 days 10 tablet 0    albuterol sulfate HFA (VENTOLIN HFA) 108 (90 Base) MCG/ACT inhaler Inhale 2 puffs into the lungs 4 times daily as needed for Wheezing 18 g 5     No current facility-administered medications for this visit. ROS:ROS:  General Constitutional:  Has chill headache. Denies lightheadedness. Ophthalmologic: Denies blurred vision. ENT: Denies nasal congestion. sore throat. Denies ear pain and pressure. Respiratory:   Cardiovascular: Denies chest pain at rest. Denies irregular heartbeat. Denies palpitations. Gastrointestinal: Denies abdominal pain. Denies blood in the stool. Denies constipation. Denies diarrhea. Denies nausea. Denies vomiting. Genitourinary  neg  Musculoskeletal: Denies muscle aches. Denies painful joints. Denies swollen joints. Peripheral Vascular: Denies pain/cramping in legs after exertion. Skin: Denies dry skin. Denies itching. Denies rash. Neurologic: Denies falls. Denies dizziness. Denies fainting. Denies tingling/numbness. Psychiatric: Denies sleep disturbance.   Is  anxiouis . Denies depressed mood. EXAM:  /80   Pulse 104   Temp 99.1 °F (37.3 °C)   Resp 16   Ht 5' 3\" (1.6 m)   Wt 151 lb (68.5 kg)   SpO2 98%   BMI 26.75 kg/m²   Wt Readings from Last 3 Encounters:   01/12/22 151 lb (68.5 kg)   12/28/21 151 lb (68.5 kg)   12/16/21 147 lb (66.7 kg)     BP Readings from Last 3 Encounters:   01/12/22 118/80   12/28/21 94/60   12/16/21 120/74     PHYSICAL EXAM:  General Appearance: in no acute distress, well developed, well nourished. Eyes: pupils equal, round reactive to light and accommodation. Ears: normal canal and TM's. Nose: nares patent, no lesions. Oral Cavity: mucosa moist.  Throat: clear. Neck/Thyroid: neck supple, full range of motion, no cervical lymphadenopathy, no thyromegaly or carotid bruits. Skin: warm and dry. No suspicious lesions. Heart: regular rate and rhythm. No murmurs. S1, S2 normal, no gallops. Lungs: clear to auscultation bilaterally. Abdomen: bowel sounds present, soft, nontender, nondistended, no masses or organomegaly. Musculoskeletal: normal, full range of motion in knees and hips, no swelling or tenderness. Extremities: no cyanosis or edema. Peripheral Pulses: 2+ throughout, symetric. Neurologic: nonfocal, motor strength normal upper and lower extremities, sensory exam intact. Psych: normal affect, speech fluent. ASSESSMENT:   Diagnosis Orders   1. Acute bronchiolitis due to unspecified organism  azithromycin (ZITHROMAX Z-BEL) 250 MG tablet         PLAN:  I reassured her she was not contagious when she was not symptomatic  She can rest but not lie down  ie expand lungs  Avoid pneumonia  No orders of the defined types were placed in this encounter. Orders Placed This Encounter   Medications    azithromycin (ZITHROMAX Z-BEL) 250 MG tablet     Sig: Take 1 tablet by mouth daily for 5 days As directed per packet instructions.      Dispense:  1 packet     Refill:  0    predniSONE (DELTASONE) 20 MG tablet     Sig: Take 1 tablet

## 2022-02-01 ENCOUNTER — OFFICE VISIT (OUTPATIENT)
Dept: FAMILY MEDICINE CLINIC | Age: 32
End: 2022-02-01
Payer: COMMERCIAL

## 2022-02-01 VITALS
HEIGHT: 63 IN | BODY MASS INDEX: 27.46 KG/M2 | RESPIRATION RATE: 14 BRPM | TEMPERATURE: 98.1 F | SYSTOLIC BLOOD PRESSURE: 110 MMHG | DIASTOLIC BLOOD PRESSURE: 80 MMHG | OXYGEN SATURATION: 98 % | HEART RATE: 98 BPM | WEIGHT: 155 LBS

## 2022-02-01 DIAGNOSIS — H61.23 BILATERAL IMPACTED CERUMEN: Primary | ICD-10-CM

## 2022-02-01 PROCEDURE — 99213 OFFICE O/P EST LOW 20 MIN: CPT | Performed by: NURSE PRACTITIONER

## 2022-02-01 RX ORDER — FLUCONAZOLE 150 MG/1
150 TABLET ORAL ONCE
Qty: 1 TABLET | Refills: 0 | Status: SHIPPED | OUTPATIENT
Start: 2022-02-01 | End: 2022-02-01

## 2022-02-01 RX ORDER — AMOXICILLIN AND CLAVULANATE POTASSIUM 875; 125 MG/1; MG/1
1 TABLET, FILM COATED ORAL 2 TIMES DAILY
Qty: 20 TABLET | Refills: 0 | Status: SHIPPED | OUTPATIENT
Start: 2022-02-01 | End: 2022-02-11

## 2022-02-01 ASSESSMENT — ENCOUNTER SYMPTOMS
SORE THROAT: 0
SINUS PRESSURE: 1
RHINORRHEA: 0
SINUS PAIN: 1

## 2022-02-01 NOTE — PATIENT INSTRUCTIONS
SURVEY:    You may be receiving a survey from [employer] regarding your visit today. Please complete the survey to enable us to provide the highest quality of care to you and your family. If you cannot score us a very good on any question, please call the office to discuss how we could of made your experience a very good one. Thank you.

## 2022-02-01 NOTE — PROGRESS NOTES
2/1/22 2/1/22 Yes Fran Borja, APRN - CNP   albuterol sulfate HFA (VENTOLIN HFA) 108 (90 Base) MCG/ACT inhaler Inhale 2 puffs into the lungs 4 times daily as needed for Wheezing 1/12/22  Yes Prabhu Egan MD        Allergies:       Cashews [macadamia nut oil] and [place] flavor    Social History:     Tobacco:    reports that she has never smoked. She has never used smokeless tobacco.  Alcohol:      reports current alcohol use of about 3.0 standard drinks of alcohol per week. Drug Use:  reports no history of drug use. Family History:     Family History   Problem Relation Age of Onset    No Known Problems Mother     Cancer Father         skin    No Known Problems Sister     No Known Problems Brother     Other Brother         internal strep infection causing organ failure, age 15    No Known Problems Maternal Grandmother     No Known Problems Maternal Grandfather     No Known Problems Paternal Grandmother     No Known Problems Paternal Grandfather        Review of Systems:     Positive and Negative as described in HPI    Review of Systems   Constitutional: Negative for fatigue. HENT: Positive for ear pain, sinus pressure and sinus pain. Negative for congestion, rhinorrhea and sore throat. Neurological: Positive for headaches. Physical Exam:   Vitals:  /80   Pulse 98   Temp 98.1 °F (36.7 °C)   Resp 14   Ht 5' 3\" (1.6 m)   Wt 155 lb (70.3 kg)   SpO2 98%   BMI 27.46 kg/m²     Physical Exam  Constitutional:       General: She is not in acute distress. Appearance: Normal appearance. She is normal weight. She is not ill-appearing or toxic-appearing. HENT:      Head: Normocephalic. Comments: Left maxillary sinus tenderness to palpation     Right Ear: External ear normal. There is impacted cerumen. Left Ear: External ear normal. There is impacted cerumen. Nose: Nose normal. No congestion or rhinorrhea.       Mouth/Throat:      Mouth: Mucous membranes are moist. Pharynx: No oropharyngeal exudate or posterior oropharyngeal erythema. Cardiovascular:      Rate and Rhythm: Normal rate and regular rhythm. Heart sounds: Normal heart sounds. No murmur heard. Pulmonary:      Effort: Pulmonary effort is normal. No respiratory distress. Breath sounds: Normal breath sounds. No stridor. No wheezing, rhonchi or rales. Musculoskeletal:      Cervical back: Neck supple. Lymphadenopathy:      Cervical: No cervical adenopathy. Skin:     General: Skin is warm. Neurological:      Mental Status: She is alert and oriented to person, place, and time. Psychiatric:         Mood and Affect: Mood normal.         Behavior: Behavior normal.         Thought Content: Thought content normal.         Judgment: Judgment normal.         Data:     Lab Results   Component Value Date     08/25/2020    K 4.4 08/25/2020     08/25/2020    CO2 24 08/25/2020    BUN 15 08/25/2020    CREATININE 0.60 08/25/2020    GLUCOSE 91 08/25/2020    PROT 6.8 06/03/2019    LABALBU 3.6 06/03/2019    BILITOT 0.59 06/03/2019    ALKPHOS 96 06/03/2019    AST 14 08/25/2020    ALT 13 08/25/2020     Lab Results   Component Value Date    WBC 6.3 07/21/2021    RBC 3.59 07/21/2021    HGB 11.2 07/21/2021    HCT 35.0 07/21/2021    MCV 97.5 07/21/2021    MCH 31.2 07/21/2021    MCHC 32.0 07/21/2021    RDW 11.9 07/21/2021     07/21/2021    MPV 10.5 07/21/2021     Lab Results   Component Value Date    TSH 1.08 06/02/2014     Lab Results   Component Value Date    CHOL 176 08/05/2021    CHOL 176 08/05/2021    HDL 64 08/05/2021    HDL 64 08/05/2021       Assessment/Plan:      Diagnosis Orders   1. Bilateral impacted cerumen       Suspected left AOM. Possible sinus involvement. Physical exam limited due to impacted cerumen bilaterally. Rx Augmentin twice daily x10 days prescribed today. D/C azithromycin. Patient notes she is prone to vulvovaginal candidal infections with antibiotic use.   Will prophylactically prescribe Diflucan 150 mg once to take as needed. Continue Tylenol/ibuprofen as needed  Notify office if symptoms worsen or persist  Follow-up in 10 days for bilateral ear irrigation. Prep information supplied to patient. Completed Refills   Requested Prescriptions     Signed Prescriptions Disp Refills    amoxicillin-clavulanate (AUGMENTIN) 875-125 MG per tablet 20 tablet 0     Sig: Take 1 tablet by mouth 2 times daily for 10 days    fluconazole (DIFLUCAN) 150 MG tablet 1 tablet 0     Sig: Take 1 tablet by mouth once for 1 dose as needed for vulvovaginal yeast infection       No orders of the defined types were placed in this encounter. No results found for this visit on 02/01/22. Return in about 10 days (around 2/11/2022), or if symptoms worsen or fail to improve, for bilateral ear irrigation .     Electronically signed by JENNI Rivera CNP on 02/01/22 at 8:52 AM.

## 2022-02-11 ENCOUNTER — NURSE ONLY (OUTPATIENT)
Dept: FAMILY MEDICINE CLINIC | Age: 32
End: 2022-02-11

## 2022-02-11 DIAGNOSIS — H61.23 BILATERAL IMPACTED CERUMEN: Primary | ICD-10-CM

## 2022-02-18 ENCOUNTER — OFFICE VISIT (OUTPATIENT)
Dept: FAMILY MEDICINE CLINIC | Age: 32
End: 2022-02-18
Payer: COMMERCIAL

## 2022-02-18 DIAGNOSIS — H61.23 BILATERAL IMPACTED CERUMEN: Primary | ICD-10-CM

## 2022-02-18 PROCEDURE — 69209 REMOVE IMPACTED EAR WAX UNI: CPT | Performed by: NURSE PRACTITIONER

## 2022-02-18 NOTE — PROGRESS NOTES
Nazanin Knight [address] [address]  [place] [id number]  Dept: [phone]    Ear Cerumen Removal Procedure Note  Indication: ear cerumen impaction    Procedure: After placing the patient's head in the appropriate position, the patient's BILATERAL ear canal was irrigated with the appropriate solution until all cerumen was removed and the ear canal was clear. The patient tolerated the procedure well.

## 2022-02-18 NOTE — PATIENT INSTRUCTIONS
SURVEY:    You may be receiving a survey from [email or web address] regarding your visit today. Please complete the survey to enable us to provide the highest quality of care to you and your family. If you cannot score us a very good on any question, please call the office to discuss how we could of made your experience a very good one. Thank you.
electronic

## 2022-12-21 ENCOUNTER — TELEPHONE (OUTPATIENT)
Dept: OBGYN | Age: 32
End: 2022-12-21

## 2022-12-21 ENCOUNTER — OFFICE VISIT (OUTPATIENT)
Dept: OBGYN | Age: 32
End: 2022-12-21
Payer: COMMERCIAL

## 2022-12-21 ENCOUNTER — HOSPITAL ENCOUNTER (OUTPATIENT)
Age: 32
Setting detail: SPECIMEN
Discharge: HOME | End: 2022-12-21
Payer: COMMERCIAL

## 2022-12-21 VITALS
DIASTOLIC BLOOD PRESSURE: 72 MMHG | WEIGHT: 163.6 LBS | HEIGHT: 63 IN | SYSTOLIC BLOOD PRESSURE: 130 MMHG | BODY MASS INDEX: 28.99 KG/M2

## 2022-12-21 DIAGNOSIS — N92.6 IRREGULAR MENSES: ICD-10-CM

## 2022-12-21 DIAGNOSIS — K64.8 INTERNAL HEMORRHOID: ICD-10-CM

## 2022-12-21 DIAGNOSIS — K60.2 ANAL FISSURE: ICD-10-CM

## 2022-12-21 DIAGNOSIS — Z01.419 ENCOUNTER FOR ANNUAL ROUTINE GYNECOLOGICAL EXAMINATION: Primary | ICD-10-CM

## 2022-12-21 DIAGNOSIS — K62.5 RECTAL BLEEDING: ICD-10-CM

## 2022-12-21 PROCEDURE — 87070 CULTURE OTHR SPECIMN AEROBIC: CPT

## 2022-12-21 PROCEDURE — 99395 PREV VISIT EST AGE 18-39: CPT | Performed by: ADVANCED PRACTICE MIDWIFE

## 2022-12-21 RX ORDER — LEVONORGESTREL/ETHINYL ESTRADIOL AND ETHINYL ESTRADIOL 100-20(84)
KIT ORAL
COMMUNITY
Start: 2022-12-08

## 2022-12-21 ASSESSMENT — ENCOUNTER SYMPTOMS
ABDOMINAL PAIN: 0
SHORTNESS OF BREATH: 0
BACK PAIN: 0

## 2022-12-21 NOTE — PROGRESS NOTES
YEARLY PHYSICAL    Date of service: 2022    Karlene Rashid  Is a 27 y.o.  , female    PT's PCP is: Joe Haile MD     : 1990                                             Subjective:       Patient's last menstrual period was 2022 (approximate).      Are your menses regular: yes    OB History    Para Term  AB Living   5 3 3   2 3   SAB IAB Ectopic Molar Multiple Live Births   2       0 3      # Outcome Date GA Lbr Robin/2nd Weight Sex Delivery Anes PTL Lv   5 SAB 21 10w0d    SAB   FD   4 Term 19 39w0d 08:18 / 00:09 8 lb 8.8 oz (3.878 kg) M Vag-Spont None N VIOLETA   3 Term 14 39w5d 07:01 8 lb 6.1 oz (3.802 kg) F Vag-Spont EPI  VIOLETA   2 2013 9w0d             Birth Comments: 13 week D&C   1 Term 11 41w0d  9 lb 4 oz (4.196 kg) M Vag-Spont EPI N VIOLETA        Social History     Tobacco Use   Smoking Status Never   Smokeless Tobacco Never        Social History     Substance and Sexual Activity   Alcohol Use Yes    Alcohol/week: 3.0 standard drinks    Types: 3 Glasses of wine per week    Comment: social       Family History   Problem Relation Age of Onset    No Known Problems Mother     Cancer Father         skin    No Known Problems Sister     No Known Problems Brother     Other Brother         internal strep infection causing organ failure, age 15    No Known Problems Maternal Grandmother     No Known Problems Maternal Grandfather     Other Paternal Grandmother         COPD    No Known Problems Paternal Grandfather     Other Son         heart defect       Any family history of breast or ovarian cancer: No    Any family history of blood clots: No    Have you had a positive covid test: Yes    Have you had the covid immunization: Yes      Allergies: Cashews [macadamia nut oil] and [place] flavor      Current Outpatient Medications:     LOJAIMIESS 0.1-0.02 & 0.01 MG TABS, , Disp: , Rfl:     albuterol sulfate HFA (VENTOLIN HFA) 108 (90 Base) MCG/ACT inhaler, Inhale 2 puffs into the lungs 4 times daily as needed for Wheezing, Disp: 18 g, Rfl: 5    ALPRAZolam (XANAX) 0.25 MG tablet, Take 1 tablet by mouth daily as needed for Anxiety for up to 60 days. , Disp: 30 tablet, Rfl: 0    Social History     Substance and Sexual Activity   Sexual Activity Yes    Partners: Male    Birth control/protection: Male Sterilization, Pill       Any bleeding or pain with intercourse: Yes    Last Yearly date:  2021    Last pap date and results: 12/15/2020 negative    Last HPV date and results: 12/15/2020 Negative    Last Mammogram date and results: never    Last Dexa scan date and results: never    Last colorectal screen- type:never*  date  never results never    Do you do self breast exams: Yes    Past Medical History:   Diagnosis Date    Anemia during pregnancy in second trimester 2019    Breast disorder     [place] Jacki infection     Exacerbation of rad (reactive airway disease), mild intermittent 3/1/2019       Past Surgical History:   Procedure Laterality Date    DILATION AND CURETTAGE      DILATION AND CURETTAGE OF UTERUS  11    missed        Family History   Problem Relation Age of Onset    No Known Problems Mother     Cancer Father         skin    No Known Problems Sister     No Known Problems Brother     Other Brother         internal strep infection causing organ failure, age 15    No Known Problems Maternal Grandmother     No Known Problems Maternal Grandfather     Other Paternal Grandmother         COPD    No Known Problems Paternal Grandfather     Other Son         heart defect       Chief Complaint   Patient presents with    Gynecologic Exam     Yearly exam. Last pap 12/15/2020 negative, HPV not detected. Patient has concerns with heavy periods first day of cycle. Patient started on Lojaimiess approx. 3 months ago from on line company.  Moods seem better and migraines are better however has spotting and decreased sex drive. Spouse has vasectomy. Concerns with hemorrhoids. Patient under a lot of stress ,child will be having open heart surgery in next several months. PE:  Vital Signs  Blood pressure 130/72, height 5' 3\" (1.6 m), weight 163 lb 9.6 oz (74.2 kg), last menstrual period 12/14/2022, not currently breastfeeding. Estimated body mass index is 28.98 kg/m² as calculated from the following:    Height as of this encounter: 5' 3\" (1.6 m). Weight as of this encounter: 163 lb 9.6 oz (74.2 kg). Labs:    No results found for this visit on 12/21/22. No data recorded    NURSE: Dinh ALONZO    HPI: here for annual gyn exam, is willing to \"stick with\" this pill awhile longer (is generic for LoSeasonique) but is more concerned with her hemorrhoid. Review of Systems   Constitutional: Negative. HENT:  Negative for congestion. Respiratory:  Negative for shortness of breath. Cardiovascular:  Negative for chest pain. Gastrointestinal:  Negative for abdominal pain. Genitourinary:  Negative for dysuria. Musculoskeletal:  Negative for back pain. Skin:  Negative for rash. Neurological:  Negative for headaches. Psychiatric/Behavioral:  The patient is not nervous/anxious. Objective  Lymphatic:   no lymphadenopathy  Heent:   negative   Cor: regular rate and rhythm, no murmurs              Pul:clear to auscultation bilaterally- no wheezes, rales or rhonchi, normal air movement, no respiratory distress      GI: Abdomen soft, non-tender. BS normal. No masses,  No organomegaly           Extremities: normal strength, tone, and muscle mass   Breasts: Breast:normal appearance, no masses or tenderness   Pelvic Exam: GENITAL/URINARY:  External Genitalia:  General appearance; normal, Hair distribution; normal, Lesions absent  Urethral Meatus:  Size normal, Location normal, Lesions absent, Prolapse absent  Urethra:   Fullness absent, Masses absent  Bladder:  Fullness absent, Masses absent, Tenderness absent, Cystocele absent  Vagina:  General appearance normal, Estrogen effect normal, Discharge absent, Lesions absent, Pelvic support normal  Cervix:  General appearance normal, Lesions absent, Discharge absent, Tenderness absent, Enlargement absent, Nodularity absent  Uterus:  Size normal, Tenderness absent  Adenexa: Masses absent, Tenderness absent  Anus/Perineum:  Lesions absent and Masses absent                                                        Assessment and Plan          Diagnosis Orders   1. Encounter for annual routine gynecological examination        2. Rectal bleeding  Eric Reeder MD, [facility], [place]      3. Ruthie Willis MD, [facility], [place]      4. Internal hemorrhoid  Eric Reeder MD, [facility]astroenter[facility], [place]      5. Irregular menses  Culture, Genital          patient to continue ocp through online pharmacy      I am having Robina Sis. [place] maintain her albuterol sulfate HFA and LoJaimiess. Return in about 1 year (around 12/21/2023) for yearly. She was also counseled on her preventative health maintenance recommendations and follow-up. There are no Patient Instructions on file for this visit.     JENNI Beyer CNM,12/21/2022 4:26 PM

## 2022-12-24 LAB
CULTURE: NORMAL
SPECIMEN DESCRIPTION: NORMAL

## 2023-04-03 ENCOUNTER — OFFICE VISIT (OUTPATIENT)
Dept: GASTROENTEROLOGY | Age: 33
End: 2023-04-03
Payer: COMMERCIAL

## 2023-04-03 VITALS
HEART RATE: 102 BPM | HEIGHT: 63 IN | TEMPERATURE: 98.6 F | RESPIRATION RATE: 16 BRPM | WEIGHT: 166 LBS | SYSTOLIC BLOOD PRESSURE: 136 MMHG | DIASTOLIC BLOOD PRESSURE: 89 MMHG | BODY MASS INDEX: 29.41 KG/M2

## 2023-04-03 DIAGNOSIS — K64.5 THROMBOSED HEMORRHOIDS: Primary | ICD-10-CM

## 2023-04-03 DIAGNOSIS — K62.5 RECTAL BLEEDING: ICD-10-CM

## 2023-04-03 PROCEDURE — 99202 OFFICE O/P NEW SF 15 MIN: CPT | Performed by: INTERNAL MEDICINE

## 2023-04-03 RX ORDER — LIDOCAINE AND MINERAL OIL AND PHENYLEPHRINE HCL AND WHITE PETROLATUM 50; 170; 2.5; 39 MG/G; MG/G; MG/G; MG/G
1 CREAM TOPICAL DAILY
Qty: 30 G | Refills: 2 | Status: SHIPPED | OUTPATIENT
Start: 2023-04-03

## 2023-04-03 RX ORDER — ALPRAZOLAM 0.5 MG/1
0.5 TABLET ORAL NIGHTLY PRN
COMMUNITY

## 2023-04-03 RX ORDER — POLYETHYLENE GLYCOL 3350, SODIUM CHLORIDE, SODIUM BICARBONATE, POTASSIUM CHLORIDE 420; 11.2; 5.72; 1.48 G/4L; G/4L; G/4L; G/4L
4000 POWDER, FOR SOLUTION ORAL ONCE
Qty: 4000 ML | Refills: 0 | Status: SHIPPED | OUTPATIENT
Start: 2023-04-03 | End: 2023-04-03

## 2023-04-03 ASSESSMENT — ENCOUNTER SYMPTOMS
ANAL BLEEDING: 1
RECTAL PAIN: 1

## 2023-04-03 NOTE — PROGRESS NOTES
[facility]    Chief Complaint   Patient presents with    New Patient     Rectal bleeding comes and goes, has hemorrhoids, told she has an anal fissure. Uses creams, eats healthy. Has never had a colonoscopy. Denies any family hx of colon cancer. HPI Arbutus Essex is a 25-year-old woman with a past medical history of hemorrhoids who presents with a report of anal pain and rectal bleeding adding that the anal pain has been ongoing since she had her 3rd baby. She has never had a colonoscopy and denies a family history of colon cancer. She denies any weight loss. She states that she does not want a procedure and is hesitant about getting any procedure scheduled - the thought she states causes her a lot of anxiety. Family history of colon cancer: No  Blood in stool: No  Unintentional weight loss: No  Abdominal pain: No  Prior colonoscopy: No  Constipation history: No  Number of bowel movements a day: No  Change in stool caliber: No      Past Medical History:   Diagnosis Date    Anemia during pregnancy in second trimester 2019    Breast disorder     [place] Lo infection     Exacerbation of rad (reactive airway disease), mild intermittent 3/1/2019         Past Surgical History:   Procedure Laterality Date    DILATION AND CURETTAGE      DILATION AND CURETTAGE OF UTERUS  11    missed          Current Outpatient Medications   Medication Sig Dispense Refill    ALPRAZolam (XANAX) 0.5 MG tablet Take 1 tablet by mouth nightly as needed for Sleep. Max Daily Amount: 0.5 mg      LOJAIMIESS 0.1-0.02 & 0.01 MG TABS       albuterol sulfate HFA (VENTOLIN HFA) 108 (90 Base) MCG/ACT inhaler Inhale 2 puffs into the lungs 4 times daily as needed for Wheezing 18 g 5     No current facility-administered medications for this visit.          Family History   Problem Relation Age of Onset    No Known Problems Mother     Cancer Father         skin    No Known

## 2023-04-03 NOTE — PATIENT INSTRUCTIONS
SURVEY:    You may be receiving a survey from [employer] regarding your visit today. Please complete the survey to enable us to provide the highest quality of care to you and your family. If you cannot score us a very good on any question, please call the office to discuss how we could have made your experience a very good one. Thank you.

## 2023-06-29 ENCOUNTER — APPOINTMENT (OUTPATIENT)
Dept: URBAN - METROPOLITAN AREA CLINIC 204 | Age: 33
Setting detail: DERMATOLOGY
End: 2023-07-03

## 2023-06-29 DIAGNOSIS — D18.0 HEMANGIOMA: ICD-10-CM

## 2023-06-29 DIAGNOSIS — D22 MELANOCYTIC NEVI: ICD-10-CM

## 2023-06-29 PROBLEM — D18.01 HEMANGIOMA OF SKIN AND SUBCUTANEOUS TISSUE: Status: ACTIVE | Noted: 2023-06-29

## 2023-06-29 PROBLEM — D22.72 MELANOCYTIC NEVI OF LEFT LOWER LIMB, INCLUDING HIP: Status: ACTIVE | Noted: 2023-06-29

## 2023-06-29 PROCEDURE — 99202 OFFICE O/P NEW SF 15 MIN: CPT

## 2023-06-29 PROCEDURE — OTHER COUNSELING: OTHER

## 2023-06-29 PROCEDURE — OTHER MIPS QUALITY: OTHER

## 2023-06-29 ASSESSMENT — LOCATION ZONE DERM
LOCATION ZONE: TRUNK
LOCATION ZONE: LEG

## 2023-06-29 ASSESSMENT — LOCATION DETAILED DESCRIPTION DERM
LOCATION DETAILED: LEFT PROXIMAL PRETIBIAL REGION
LOCATION DETAILED: PERIUMBILICAL SKIN

## 2023-06-29 ASSESSMENT — LOCATION SIMPLE DESCRIPTION DERM
LOCATION SIMPLE: LEFT PRETIBIAL REGION
LOCATION SIMPLE: ABDOMEN

## 2023-06-29 NOTE — PROCEDURE: MIPS QUALITY
Quality 130: Documentation Of Current Medications In The Medical Record: Current Medications Documented
Quality 47: Advance Care Plan: Advance Care Planning discussed and documented in the medical record; patient did not wish or was not able to name a surrogate decision maker or provide an advance care plan.
Detail Level: Detailed
Quality 110: Preventive Care And Screening: Influenza Immunization: Influenza Immunization previously received during influenza season
Quality 226: Preventive Care And Screening: Tobacco Use: Screening And Cessation Intervention: Patient screened for tobacco use and is an ex/non-smoker

## 2023-11-21 PROBLEM — J18.9 PNEUMONIA: Status: RESOLVED | Noted: 2019-02-24 | Resolved: 2023-11-21

## 2023-11-21 PROBLEM — O21.0 HYPEREMESIS GRAVIDARUM: Status: RESOLVED | Noted: 2019-02-23 | Resolved: 2023-11-21

## 2023-11-21 PROBLEM — E87.6 HYPOKALEMIA: Status: RESOLVED | Noted: 2019-02-23 | Resolved: 2023-11-21

## 2023-11-21 PROBLEM — J02.0 PHARYNGITIS DUE TO STREPTOCOCCUS SPECIES: Status: RESOLVED | Noted: 2017-04-14 | Resolved: 2023-11-21

## 2023-11-21 PROBLEM — D64.9 ABSOLUTE ANEMIA: Status: RESOLVED | Noted: 2019-03-13 | Resolved: 2023-11-21

## 2023-11-21 PROBLEM — J11.1 INFLUENZA: Status: RESOLVED | Noted: 2019-02-23 | Resolved: 2023-11-21

## 2023-11-21 PROBLEM — E87.1 HYPONATREMIA: Status: RESOLVED | Noted: 2019-02-24 | Resolved: 2023-11-21

## 2023-12-28 ENCOUNTER — HOSPITAL ENCOUNTER (OUTPATIENT)
Age: 33
Setting detail: SPECIMEN
Discharge: HOME | End: 2023-12-28
Payer: COMMERCIAL

## 2023-12-28 ENCOUNTER — OFFICE VISIT (OUTPATIENT)
Dept: OBGYN | Age: 33
End: 2023-12-28
Payer: COMMERCIAL

## 2023-12-28 VITALS
SYSTOLIC BLOOD PRESSURE: 112 MMHG | BODY MASS INDEX: 29.27 KG/M2 | WEIGHT: 165.2 LBS | HEIGHT: 63 IN | DIASTOLIC BLOOD PRESSURE: 64 MMHG

## 2023-12-28 DIAGNOSIS — Z01.419 ENCOUNTER FOR ANNUAL ROUTINE GYNECOLOGICAL EXAMINATION: Primary | ICD-10-CM

## 2023-12-28 DIAGNOSIS — N76.2 ACUTE VULVITIS: ICD-10-CM

## 2023-12-28 LAB
CANDIDA SPECIES: POSITIVE
GARDNERELLA VAGINALIS: NEGATIVE
SOURCE: ABNORMAL
TRICHOMONAS: NEGATIVE

## 2023-12-28 PROCEDURE — 99395 PREV VISIT EST AGE 18-39: CPT | Performed by: ADVANCED PRACTICE MIDWIFE

## 2023-12-28 PROCEDURE — 87070 CULTURE OTHR SPECIMN AEROBIC: CPT

## 2023-12-28 PROCEDURE — 87480 CANDIDA DNA DIR PROBE: CPT

## 2023-12-28 PROCEDURE — 87660 TRICHOMONAS VAGIN DIR PROBE: CPT

## 2023-12-28 PROCEDURE — 87510 GARDNER VAG DNA DIR PROBE: CPT

## 2023-12-28 RX ORDER — CLOBETASOL PROPIONATE 0.5 MG/G
OINTMENT TOPICAL
Qty: 30 G | Refills: 1 | Status: SHIPPED | OUTPATIENT
Start: 2023-12-28

## 2023-12-28 RX ORDER — FLUCONAZOLE 150 MG/1
150 TABLET ORAL ONCE
Qty: 1 TABLET | Refills: 0 | Status: SHIPPED | OUTPATIENT
Start: 2023-12-28 | End: 2023-12-28

## 2023-12-28 NOTE — PROGRESS NOTES
Prolapse absent  Urethra: Fullness absent, Masses absent  Bladder:  Fullness absent, Masses absent, Tenderness absent, Cystocele absent  Vagina:  General appearance normal, Estrogen effect normal, Lesions absent, Pelvic support normal, thin white discharge, no odor  Cervix:  General appearance normal, Lesions absent, Discharge absent, Tenderness absent, Enlargement absent, Nodularity absent  Uterus:  Size normal, Tenderness absent  Adenexa: Masses absent, Tenderness absent  Anus/Perineum:  Lesions absent and Masses absent                                                      Assessment and Plan          Diagnosis Orders   1. Encounter for annual routine gynecological examination        2. Acute vulvitis  clobetasol (TEMOVATE) 0.05 % ointment    Vaginitis DNA Probe    Culture, Genital          appears as combination infection if cx negative consider treating for both    Father had melanoma mets to the breast; reports sister had genetic testing and was told she was negative and family did not need further testing; I explained to the patient that this meant her sisters descendants, not her siblings as her inheritance from their father could be different, she agrees to genetic counseling in the next year and it has already been ordered    I have discontinued Jeane Beebe's LoJaimiess. I am also having her start on clobetasol and fluconazole. Additionally, I am having her maintain her albuterol sulfate HFA and ALPRAZolam.    Return in about 1 year (around 12/28/2024) for yearly. She was also counseled on her preventative health maintenance recommendations and follow-up. There are no Patient Instructions on file for this visit.     JENNI Rodríguez CNM,12/29/2023 11:33 AM

## 2023-12-31 LAB
MICROORGANISM SPEC CULT: ABNORMAL
SPECIMEN DESCRIPTION: ABNORMAL

## 2024-01-02 RX ORDER — FLUCONAZOLE 150 MG/1
TABLET ORAL
Qty: 1 TABLET | Refills: 6 | Status: SHIPPED | OUTPATIENT
Start: 2024-01-02

## 2024-06-18 ENCOUNTER — OFFICE VISIT (OUTPATIENT)
Dept: PRIMARY CARE CLINIC | Age: 34
End: 2024-06-18
Payer: COMMERCIAL

## 2024-06-18 VITALS
RESPIRATION RATE: 16 BRPM | BODY MASS INDEX: 29.3 KG/M2 | SYSTOLIC BLOOD PRESSURE: 114 MMHG | HEART RATE: 99 BPM | DIASTOLIC BLOOD PRESSURE: 78 MMHG | WEIGHT: 165.4 LBS | OXYGEN SATURATION: 98 %

## 2024-06-18 DIAGNOSIS — Z00.00 WELL ADULT EXAM: Primary | ICD-10-CM

## 2024-06-18 PROCEDURE — 99395 PREV VISIT EST AGE 18-39: CPT | Performed by: FAMILY MEDICINE

## 2024-06-18 SDOH — ECONOMIC STABILITY: INCOME INSECURITY: HOW HARD IS IT FOR YOU TO PAY FOR THE VERY BASICS LIKE FOOD, HOUSING, MEDICAL CARE, AND HEATING?: NOT HARD AT ALL

## 2024-06-18 SDOH — ECONOMIC STABILITY: FOOD INSECURITY: WITHIN THE PAST 12 MONTHS, YOU WORRIED THAT YOUR FOOD WOULD RUN OUT BEFORE YOU GOT MONEY TO BUY MORE.: NEVER TRUE

## 2024-06-18 SDOH — ECONOMIC STABILITY: HOUSING INSECURITY
IN THE LAST 12 MONTHS, WAS THERE A TIME WHEN YOU DID NOT HAVE A STEADY PLACE TO SLEEP OR SLEPT IN A SHELTER (INCLUDING NOW)?: NO

## 2024-06-18 SDOH — ECONOMIC STABILITY: FOOD INSECURITY: WITHIN THE PAST 12 MONTHS, THE FOOD YOU BOUGHT JUST DIDN'T LAST AND YOU DIDN'T HAVE MONEY TO GET MORE.: NEVER TRUE

## 2024-06-18 SDOH — HEALTH STABILITY: PHYSICAL HEALTH: ON AVERAGE, HOW MANY DAYS PER WEEK DO YOU ENGAGE IN MODERATE TO STRENUOUS EXERCISE (LIKE A BRISK WALK)?: 3 DAYS

## 2024-06-18 SDOH — HEALTH STABILITY: PHYSICAL HEALTH: ON AVERAGE, HOW MANY MINUTES DO YOU ENGAGE IN EXERCISE AT THIS LEVEL?: 30 MIN

## 2024-06-18 ASSESSMENT — PATIENT HEALTH QUESTIONNAIRE - PHQ9
SUM OF ALL RESPONSES TO PHQ9 QUESTIONS 1 & 2: 0
2. FEELING DOWN, DEPRESSED OR HOPELESS: NOT AT ALL
SUM OF ALL RESPONSES TO PHQ QUESTIONS 1-9: 0
1. LITTLE INTEREST OR PLEASURE IN DOING THINGS: NOT AT ALL

## 2024-06-18 NOTE — PROGRESS NOTES
[facility] primary care   24    Subjective:  Jeane Beebe is a 34 y.o. female for an annual wellness exam.  She has been feeling fine and has had no new problems since last office visit.    Allergies:  Cashews [macadamia nut oil] and [place] flavor    Past Medical History:    Past Medical History:   Diagnosis Date    Anemia during pregnancy in second trimester 2019    Breast disorder     Brian Barr infection 2014    Exacerbation of rad (reactive airway disease), mild intermittent 3/1/2019       Past Surgical History:    Past Surgical History:   Procedure Laterality Date    DILATION AND CURETTAGE      DILATION AND CURETTAGE OF UTERUS  11    missed        Social History:   Social History     Tobacco Use    Smoking status: Never    Smokeless tobacco: Never   Substance Use Topics    Alcohol use: Yes     Alcohol/week: 3.0 standard drinks of alcohol     Types: 3 Glasses of wine per week     Comment: social       Family History:   Family History   Problem Relation Age of Onset    No Known Problems Mother     Cancer Father         skin    Breast Cancer Father     No Known Problems Sister     No Known Problems Brother     Other Brother         internal strep infection causing organ failure, age 13    No Known Problems Maternal Grandmother     No Known Problems Maternal Grandfather     Other Paternal Grandmother         COPD    No Known Problems Paternal Grandfather     Other Son         heart defect    Inflam Bowel Dis Maternal Cousin          Review of Systems:  See HPI  Objective:  Physical Exam:  GEN:   A & O x3, no apparent distress  EYES:  No gross abnormalities.  ENT:ENT exam normal, no neck nodes or sinus tenderness  NECK: normal  PULM: clear to auscultation bilaterally- no wheezes, rales or rhonchi, normal air movement  COR:   regular rate & rhythm  ABD:    soft, non-tender and non-distended  EXT: normal strength, tone, and muscle mass, normal gait  NEURO: Motor and sensory grossly

## 2024-12-16 ENCOUNTER — OFFICE VISIT (OUTPATIENT)
Dept: PRIMARY CARE CLINIC | Age: 34
End: 2024-12-16

## 2024-12-16 VITALS
OXYGEN SATURATION: 98 % | BODY MASS INDEX: 29.12 KG/M2 | SYSTOLIC BLOOD PRESSURE: 90 MMHG | HEART RATE: 94 BPM | DIASTOLIC BLOOD PRESSURE: 60 MMHG | WEIGHT: 164.4 LBS

## 2024-12-16 DIAGNOSIS — E78.00 ELEVATED LDL CHOLESTEROL LEVEL: ICD-10-CM

## 2024-12-16 DIAGNOSIS — Z01.89 ENCOUNTER FOR OTHER SPECIFIED SPECIAL EXAMINATIONS: Primary | ICD-10-CM

## 2024-12-16 DIAGNOSIS — Z23 NEED FOR VACCINATION: ICD-10-CM

## 2024-12-16 SDOH — ECONOMIC STABILITY: INCOME INSECURITY: HOW HARD IS IT FOR YOU TO PAY FOR THE VERY BASICS LIKE FOOD, HOUSING, MEDICAL CARE, AND HEATING?: NOT HARD AT ALL

## 2024-12-16 SDOH — ECONOMIC STABILITY: FOOD INSECURITY: WITHIN THE PAST 12 MONTHS, YOU WORRIED THAT YOUR FOOD WOULD RUN OUT BEFORE YOU GOT MONEY TO BUY MORE.: NEVER TRUE

## 2024-12-16 SDOH — ECONOMIC STABILITY: FOOD INSECURITY: WITHIN THE PAST 12 MONTHS, THE FOOD YOU BOUGHT JUST DIDN'T LAST AND YOU DIDN'T HAVE MONEY TO GET MORE.: NEVER TRUE

## 2024-12-16 ASSESSMENT — PATIENT HEALTH QUESTIONNAIRE - PHQ9
SUM OF ALL RESPONSES TO PHQ QUESTIONS 1-9: 0
1. LITTLE INTEREST OR PLEASURE IN DOING THINGS: NOT AT ALL
2. FEELING DOWN, DEPRESSED OR HOPELESS: NOT AT ALL
SUM OF ALL RESPONSES TO PHQ QUESTIONS 1-9: 0
SUM OF ALL RESPONSES TO PHQ9 QUESTIONS 1 & 2: 0
SUM OF ALL RESPONSES TO PHQ QUESTIONS 1-9: 0
SUM OF ALL RESPONSES TO PHQ QUESTIONS 1-9: 0

## 2024-12-16 NOTE — PROGRESS NOTES
[facility] primary care   24    Subjective:  Jeane Beebe is a 34 y.o. female for an regular exam and to discuss labs.  She has been feeling fine and has had no new problems since last office visit.    Allergies:  Cashews [macadamia nut oil] and [place] flavor    Past Medical History:    Past Medical History:   Diagnosis Date    Anemia during pregnancy in second trimester 2019    Breast disorder     Brian Barr infection 2014    Exacerbation of rad (reactive airway disease), mild intermittent 3/1/2019       Past Surgical History:    Past Surgical History:   Procedure Laterality Date    DILATION AND CURETTAGE      DILATION AND CURETTAGE OF UTERUS  11    missed        Social History:   Social History     Tobacco Use    Smoking status: Never    Smokeless tobacco: Never   Substance Use Topics    Alcohol use: Yes     Alcohol/week: 3.0 standard drinks of alcohol     Types: 3 Glasses of wine per week     Comment: social       Family History:   Family History   Problem Relation Age of Onset    No Known Problems Mother     Cancer Father         skin    Breast Cancer Father     No Known Problems Sister     No Known Problems Brother     Other Brother         internal strep infection causing organ failure, age 13    No Known Problems Maternal Grandmother     No Known Problems Maternal Grandfather     Other Paternal Grandmother         COPD    No Known Problems Paternal Grandfather     Other Son         heart defect    Inflam Bowel Dis Maternal Cousin          Review of Systems:  See HPI  Objective:  Physical Exam:  GEN:   A & O x3, no apparent distress  EYES:  No gross abnormalities.  ENT:ENT exam normal, no neck nodes or sinus tenderness  NECK: normal  PULM: clear to auscultation bilaterally- no wheezes, rales or rhonchi, normal air movement  COR:   regular rate & rhythm  EXT: normal strength, tone, and muscle mass, normal gait  NEURO: Motor and sensory grossly intact  Assessment:  1. Encounter

## 2025-01-02 ENCOUNTER — OFFICE VISIT (OUTPATIENT)
Dept: OBGYN | Age: 35
End: 2025-01-02
Payer: COMMERCIAL

## 2025-01-02 ENCOUNTER — HOSPITAL ENCOUNTER (OUTPATIENT)
Age: 35
Setting detail: SPECIMEN
Discharge: HOME | End: 2025-01-02
Payer: COMMERCIAL

## 2025-01-02 VITALS
BODY MASS INDEX: 29.06 KG/M2 | SYSTOLIC BLOOD PRESSURE: 116 MMHG | DIASTOLIC BLOOD PRESSURE: 76 MMHG | WEIGHT: 164 LBS | HEIGHT: 63 IN

## 2025-01-02 DIAGNOSIS — Z80.3 FAMILY HISTORY OF BREAST CANCER IN MALE: ICD-10-CM

## 2025-01-02 DIAGNOSIS — Z12.31 SCREENING MAMMOGRAM, ENCOUNTER FOR: ICD-10-CM

## 2025-01-02 DIAGNOSIS — Z12.4 SCREENING FOR MALIGNANT NEOPLASM OF CERVIX: ICD-10-CM

## 2025-01-02 DIAGNOSIS — Z01.419 ENCOUNTER FOR ANNUAL ROUTINE GYNECOLOGICAL EXAMINATION: Primary | ICD-10-CM

## 2025-01-02 PROCEDURE — G0145 SCR C/V CYTO,THINLAYER,RESCR: HCPCS

## 2025-01-02 PROCEDURE — 99395 PREV VISIT EST AGE 18-39: CPT | Performed by: ADVANCED PRACTICE MIDWIFE

## 2025-01-02 PROCEDURE — 87624 HPV HI-RISK TYP POOLED RSLT: CPT

## 2025-01-02 ASSESSMENT — ENCOUNTER SYMPTOMS
ABDOMINAL PAIN: 0
SHORTNESS OF BREATH: 0

## 2025-01-02 ASSESSMENT — PATIENT HEALTH QUESTIONNAIRE - PHQ9
2. FEELING DOWN, DEPRESSED OR HOPELESS: NOT AT ALL
SUM OF ALL RESPONSES TO PHQ QUESTIONS 1-9: 0
SUM OF ALL RESPONSES TO PHQ9 QUESTIONS 1 & 2: 0
1. LITTLE INTEREST OR PLEASURE IN DOING THINGS: NOT AT ALL

## 2025-01-03 LAB
HPV I/H RISK 4 DNA CVX QL NAA+PROBE: NOT DETECTED
HPV SAMPLE: NORMAL
HPV, INTERPRETATION: NORMAL
HPV16 DNA CVX QL NAA+PROBE: NOT DETECTED
HPV18 DNA CVX QL NAA+PROBE: NOT DETECTED
SPECIMEN DESCRIPTION: NORMAL

## 2025-01-12 LAB — CYTOLOGY REPORT: NORMAL
